# Patient Record
Sex: MALE | Race: BLACK OR AFRICAN AMERICAN | NOT HISPANIC OR LATINO | URBAN - METROPOLITAN AREA
[De-identification: names, ages, dates, MRNs, and addresses within clinical notes are randomized per-mention and may not be internally consistent; named-entity substitution may affect disease eponyms.]

---

## 2023-06-07 ENCOUNTER — HOSPITAL ENCOUNTER (EMERGENCY)
Facility: HOSPITAL | Age: 53
Discharge: HOME | End: 2023-06-08
Attending: EMERGENCY MEDICINE
Payer: COMMERCIAL

## 2023-06-07 DIAGNOSIS — F32.A DEPRESSION, UNSPECIFIED DEPRESSION TYPE: Primary | ICD-10-CM

## 2023-06-07 PROCEDURE — 99283 EMERGENCY DEPT VISIT LOW MDM: CPT | Mod: ER

## 2023-06-07 NOTE — Clinical Note
"Vinayak"Muna Turcios was seen and treated in our emergency department on 6/7/2023.  He may return to work on 06/09/2023.       If you have any questions or concerns, please don't hesitate to call.      Kiran Antoine MD"

## 2023-06-08 VITALS
HEART RATE: 72 BPM | DIASTOLIC BLOOD PRESSURE: 83 MMHG | TEMPERATURE: 98 F | OXYGEN SATURATION: 98 % | SYSTOLIC BLOOD PRESSURE: 144 MMHG | RESPIRATION RATE: 16 BRPM

## 2023-06-08 LAB
ALBUMIN SERPL BCP-MCNC: 4.5 G/DL (ref 3.5–5.2)
ALP SERPL-CCNC: 65 U/L (ref 55–135)
ALT SERPL W/O P-5'-P-CCNC: 19 U/L (ref 10–44)
AMPHET+METHAMPHET UR QL: NEGATIVE
ANION GAP SERPL CALC-SCNC: 19 MMOL/L (ref 8–16)
APAP SERPL-MCNC: <3 UG/ML (ref 10–20)
AST SERPL-CCNC: 25 U/L (ref 10–40)
BARBITURATES UR QL SCN>200 NG/ML: NEGATIVE
BASOPHILS # BLD AUTO: 0.03 K/UL (ref 0–0.2)
BASOPHILS NFR BLD: 0.5 % (ref 0–1.9)
BENZODIAZ UR QL SCN>200 NG/ML: NEGATIVE
BILIRUB SERPL-MCNC: 0.8 MG/DL (ref 0.1–1)
BILIRUB UR QL STRIP: NEGATIVE
BUN SERPL-MCNC: 8 MG/DL (ref 6–20)
BZE UR QL SCN: NEGATIVE
CALCIUM SERPL-MCNC: 9.8 MG/DL (ref 8.7–10.5)
CANNABINOIDS UR QL SCN: NEGATIVE
CHLORIDE SERPL-SCNC: 106 MMOL/L (ref 95–110)
CLARITY UR REFRACT.AUTO: CLEAR
CO2 SERPL-SCNC: 15 MMOL/L (ref 23–29)
COLOR UR AUTO: YELLOW
CREAT SERPL-MCNC: 1.2 MG/DL (ref 0.5–1.4)
CREAT UR-MCNC: 34.9 MG/DL (ref 23–375)
DIFFERENTIAL METHOD: NORMAL
EOSINOPHIL # BLD AUTO: 0.1 K/UL (ref 0–0.5)
EOSINOPHIL NFR BLD: 1 % (ref 0–8)
ERYTHROCYTE [DISTWIDTH] IN BLOOD BY AUTOMATED COUNT: 12.4 % (ref 11.5–14.5)
EST. GFR  (NO RACE VARIABLE): >60 ML/MIN/1.73 M^2
ETHANOL SERPL-MCNC: 113 MG/DL
GLUCOSE SERPL-MCNC: 124 MG/DL (ref 70–110)
GLUCOSE UR QL STRIP: NEGATIVE
HCT VFR BLD AUTO: 42 % (ref 40–54)
HGB BLD-MCNC: 14.8 G/DL (ref 14–18)
HGB UR QL STRIP: NEGATIVE
IMM GRANULOCYTES # BLD AUTO: 0.02 K/UL (ref 0–0.04)
IMM GRANULOCYTES NFR BLD AUTO: 0.3 % (ref 0–0.5)
KETONES UR QL STRIP: NEGATIVE
LEUKOCYTE ESTERASE UR QL STRIP: NEGATIVE
LYMPHOCYTES # BLD AUTO: 1.8 K/UL (ref 1–4.8)
LYMPHOCYTES NFR BLD: 29.7 % (ref 18–48)
MCH RBC QN AUTO: 30.5 PG (ref 27–31)
MCHC RBC AUTO-ENTMCNC: 35.2 G/DL (ref 32–36)
MCV RBC AUTO: 87 FL (ref 82–98)
METHADONE UR QL SCN>300 NG/ML: NEGATIVE
MONOCYTES # BLD AUTO: 0.5 K/UL (ref 0.3–1)
MONOCYTES NFR BLD: 8.4 % (ref 4–15)
NEUTROPHILS # BLD AUTO: 3.7 K/UL (ref 1.8–7.7)
NEUTROPHILS NFR BLD: 60.1 % (ref 38–73)
NITRITE UR QL STRIP: NEGATIVE
NRBC BLD-RTO: 0 /100 WBC
OPIATES UR QL SCN: NEGATIVE
PCP UR QL SCN>25 NG/ML: NEGATIVE
PH UR STRIP: 6 [PH] (ref 5–8)
PLATELET # BLD AUTO: 290 K/UL (ref 150–450)
PMV BLD AUTO: 9.3 FL (ref 9.2–12.9)
POTASSIUM SERPL-SCNC: 3.5 MMOL/L (ref 3.5–5.1)
PROT SERPL-MCNC: 8.3 G/DL (ref 6–8.4)
PROT UR QL STRIP: NEGATIVE
RBC # BLD AUTO: 4.85 M/UL (ref 4.6–6.2)
SODIUM SERPL-SCNC: 140 MMOL/L (ref 136–145)
SP GR UR STRIP: <=1.005 (ref 1–1.03)
TOXICOLOGY INFORMATION: NORMAL
TSH SERPL DL<=0.005 MIU/L-ACNC: 1.27 UIU/ML (ref 0.4–4)
URN SPEC COLLECT METH UR: ABNORMAL
UROBILINOGEN UR STRIP-ACNC: NEGATIVE EU/DL
WBC # BLD AUTO: 6.1 K/UL (ref 3.9–12.7)

## 2023-06-08 PROCEDURE — 80143 DRUG ASSAY ACETAMINOPHEN: CPT | Mod: ER | Performed by: EMERGENCY MEDICINE

## 2023-06-08 PROCEDURE — G0426 INPT/ED TELECONSULT50: HCPCS | Mod: GT,,, | Performed by: PSYCHIATRY & NEUROLOGY

## 2023-06-08 PROCEDURE — 80053 COMPREHEN METABOLIC PANEL: CPT | Mod: ER | Performed by: EMERGENCY MEDICINE

## 2023-06-08 PROCEDURE — G0426 PR INPT TELEHEALTH CONSULT 50M: ICD-10-PCS | Mod: GT,,, | Performed by: PSYCHIATRY & NEUROLOGY

## 2023-06-08 PROCEDURE — 84443 ASSAY THYROID STIM HORMONE: CPT | Mod: ER | Performed by: EMERGENCY MEDICINE

## 2023-06-08 PROCEDURE — 80307 DRUG TEST PRSMV CHEM ANLYZR: CPT | Mod: ER | Performed by: EMERGENCY MEDICINE

## 2023-06-08 PROCEDURE — 85025 COMPLETE CBC W/AUTO DIFF WBC: CPT | Mod: ER | Performed by: EMERGENCY MEDICINE

## 2023-06-08 PROCEDURE — 82077 ASSAY SPEC XCP UR&BREATH IA: CPT | Mod: ER | Performed by: EMERGENCY MEDICINE

## 2023-06-08 PROCEDURE — 81003 URINALYSIS AUTO W/O SCOPE: CPT | Mod: ER,59 | Performed by: EMERGENCY MEDICINE
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
## 2025-02-26 DIAGNOSIS — M79.645 CHRONIC PAIN OF LEFT THUMB: Primary | ICD-10-CM

## 2025-02-26 DIAGNOSIS — G89.29 CHRONIC PAIN OF LEFT THUMB: Primary | ICD-10-CM

## 2025-02-27 ENCOUNTER — TELEPHONE (OUTPATIENT)
Dept: RESPIRATORY THERAPY | Facility: HOSPITAL | Age: 55
End: 2025-02-27
Payer: COMMERCIAL

## 2025-02-27 ENCOUNTER — TELEPHONE (OUTPATIENT)
Dept: PREADMISSION TESTING | Facility: HOSPITAL | Age: 55
End: 2025-02-27
Payer: COMMERCIAL

## 2025-02-27 ENCOUNTER — ANESTHESIA EVENT (OUTPATIENT)
Dept: SURGERY | Facility: HOSPITAL | Age: 55
End: 2025-02-27
Payer: COMMERCIAL

## 2025-02-27 ENCOUNTER — TELEPHONE (OUTPATIENT)
Dept: ORTHOPEDICS | Facility: CLINIC | Age: 55
End: 2025-02-27

## 2025-02-27 ENCOUNTER — HOSPITAL ENCOUNTER (OUTPATIENT)
Dept: RADIOLOGY | Facility: HOSPITAL | Age: 55
Discharge: HOME | End: 2025-02-27
Attending: ORTHOPAEDIC SURGERY
Payer: COMMERCIAL

## 2025-02-27 ENCOUNTER — OFFICE VISIT (OUTPATIENT)
Dept: ORTHOPEDICS | Facility: CLINIC | Age: 55
End: 2025-02-27
Payer: COMMERCIAL

## 2025-02-27 DIAGNOSIS — M79.645 CHRONIC PAIN OF LEFT THUMB: ICD-10-CM

## 2025-02-27 DIAGNOSIS — G89.29 CHRONIC PAIN OF LEFT THUMB: ICD-10-CM

## 2025-02-27 DIAGNOSIS — S62.522B OPEN DISPLACED FRACTURE OF DISTAL PHALANX OF LEFT THUMB, INITIAL ENCOUNTER: Primary | ICD-10-CM
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
## 2025-02-27 DIAGNOSIS — Z01.818 PRE-OP TESTING: Primary | ICD-10-CM

## 2025-02-27 DIAGNOSIS — S61.112A LACERATION OF LEFT THUMB WITHOUT FOREIGN BODY WITH DAMAGE TO NAIL, INITIAL ENCOUNTER: ICD-10-CM

## 2025-02-27 PROCEDURE — 99213 OFFICE O/P EST LOW 20 MIN: CPT | Mod: PBBFAC,25 | Performed by: ORTHOPAEDIC SURGERY

## 2025-02-27 PROCEDURE — 73140 X-RAY EXAM OF FINGER(S): CPT | Mod: TC,LT

## 2025-02-27 PROCEDURE — 99999 PR PBB SHADOW E&M-EST. PATIENT-LVL III: CPT | Mod: PBBFAC,,, | Performed by: ORTHOPAEDIC SURGERY

## 2025-02-27 PROCEDURE — 73140 X-RAY EXAM OF FINGER(S): CPT | Mod: 26,LT,, | Performed by: STUDENT IN AN ORGANIZED HEALTH CARE EDUCATION/TRAINING PROGRAM

## 2025-02-27 RX ORDER — ATORVASTATIN CALCIUM 10 MG/1
10 TABLET, FILM COATED ORAL DAILY
COMMUNITY

## 2025-02-27 RX ORDER — HYDROCODONE BITARTRATE AND ACETAMINOPHEN 5; 325 MG/1; MG/1
1 TABLET ORAL EVERY 6 HOURS PRN
Qty: 10 TABLET | Refills: 0 | Status: SHIPPED | OUTPATIENT
Start: 2025-02-27 | End: 2025-03-06

## 2025-02-27 RX ORDER — TRAZODONE HYDROCHLORIDE 50 MG/1
50 TABLET ORAL NIGHTLY
COMMUNITY

## 2025-02-27 RX ORDER — SERTRALINE HYDROCHLORIDE 25 MG/1
25 TABLET, FILM COATED ORAL DAILY
COMMUNITY

## 2025-02-27 RX ORDER — PANTOPRAZOLE SODIUM 20 MG/1
20 TABLET, DELAYED RELEASE ORAL DAILY
COMMUNITY

## 2025-02-27 RX ORDER — LOSARTAN POTASSIUM 25 MG/1
25 TABLET ORAL DAILY
COMMUNITY

## 2025-02-27 NOTE — TELEPHONE ENCOUNTER
Spoke to patient and let him know that Dr. Owens has sent his prescription for pain to his preferred pharmacy. He understood and was grateful for the call.     ----- Message from Denise sent at 2/27/2025 11:42 AM CST -----  Contact: America  ..Type:  Patient Request Call BackWho Called: America (wife)Does the patient know what this is regarding?: America is calling to verify if prescription for pain meds have been sent Would the patient rather a call back or a response via [facility]? Call [facility] Call Back Number: [phone] or [phone] Information: .[facility] - [place] - [address] [facility] [place] [address]Lcyav: [phone] Fax: [phone]

## 2025-02-27 NOTE — ASSESSMENT & PLAN NOTE
The patient and I talked at length about the natural history and pathophysiology of his injury which is left thumb open distal phalanx fracture , he understands that this may lead to chronic problems which may have acute episodic exacerbations.   Symptoms may resolve, worsen and even become permanent.  The patient understands the treatment options including observation, activity modification, therapy, NSAIDs, splints and the surgical options including debridement.  We discussed the risks of the diagnosis and the treatment options including pain, infection, bleeding, damage to nerves and vessels, stiffness, scarring, incomplete relief or recurrence of symptoms, poor pain and functional outcomes.  Unique risks of this diagnosis and the treatment include nail deformity, nonunion, infection.  The patients treatment is further complicated by an increased risk of stiffness, wound healing complications and infection due to diabetes.  The patient has elected to proceed with operative debridement, possible pin fixation, nail bed repair and we will follow up postop.  He will  his antibiotics that have already been called in for him.

## 2025-02-27 NOTE — TELEPHONE ENCOUNTER
Called and spoke with America, the patient's wife. I let her know that I was able to get in touch with Oswaldo. I let her know that we have the claim number from [facility] and that we will work on the authorization. I explained that this procedure is medically urgent and to show up for the surgery as instructed. I let her know we will make sure that everything is billed to [facility]. She understood and was grateful for the call.     ----- Message from Denise sent at 2/27/2025  4:23 PM CST -----  Contact: America  ..Type:  Patient Request Call BackWho Called:America (wife)Does the patient know what this is regarding?: Worker's Comp Claim numberWould the patient rather a call back or a response via [facility]? Call [facility] Call Back Number: .[phone] Additional Information: Patient's wife is trying to contact Ashli about cost of surgery, Stating she now has Worker's Comp claim number

## 2025-02-27 NOTE — TELEPHONE ENCOUNTER
..Called and spoke with the patient about the following:      Your Surgery arrival time is at 09:00 am on 2/28/2025 at [facility].   The address is [address] [place] [address].       *If you are running late or have questions the morning of surgery, please call the Pre-OP Department @ [phone].     Do not eat or drink after 12 midnight, including water. Do not smoke or use chewing tobacco after 12 midnight!  OK to brush teeth, but no gum, candy, or mints!      Additional Instructions:  You may be required to provide a urine sample prior to procedure;   Please ask  for a specimen cup if you need to use the restroom prior to being called into pre-op.     Please come to the main lobby and be prepared to show your photo ID and insurance card.       Only take specific medications discussed with the Pre-Admit Nurse.      Please call with any questions or concerns ([phone] or [phone])    [facility]/Ride Policy:   Adults:  Only 1 adult allowed (must be 18 or older) to accompany you and MUST STAY through the entire length of admission.     -Must have a ride home from a responsible adult that you know and trust.    -Medical Transport, Uber or Lyft can only be used if patient has a responsible adult to accompany them during ride home.    Pediatrics:  Pediatric patients are encouraged to have 2 adults (must be 18 or older) accompany them to the surgery center.   ~If the parent/legal guardian is unable to stay for the duration of the surgery time,   you MUST have someone over the age of 18 able to stay with the patient until time of discharge.     ~The parent/legal guardian must be available to be reached by phone at all times if they are unable to stay with the patient.

## 2025-02-27 NOTE — TELEPHONE ENCOUNTER
Pre op instructions reviewed with pt per phone.      To confirm, your doctor has instructed you: Surgery is scheduled for 2/28/25.   Pre admit office will call the afternoon prior to surgery between 1PM and 3PM with arrival time.    Surgery will be at [facility],  The address is [address]. [place] [address].      IMPORTANT INSTRUCTIONS!    Do not eat or drink after 12 midnight, including water. Do not smoke or use chewing tobacco after 12 midnight!  OK to brush teeth, but no gum, candy, or mints!      *Take only these medicines with a small swallow of water-morning of surgery*     NONE         ____ Stop taking all vitamins, herbal supplements, Aspirin, & NSAIDS (Ibuprofen, Advil, Aleve) 7 days prior to surgery, as these can thin your blood.    ____ Weight loss medication, such as Adipex and Phentermine, must be stopped 14 days prior to surgery, no exceptions!    *Diabetic Patients: If you take diabetic or weight loss medication, do NOT take morning of surgery unless instructed by Doctor. Metformin to be stopped 24 hrs prior to surgery. DO NOT take short-acting insulin the day of surgery. Only take HALF of your regular dose of long-acting insulin the night before surgery, unless instructed otherwise. Blood sugars will be checked in pre-op.   ~Ozempic/Mounjaro/Wegovy/Trulicity/Semaglutide injections must be stopped 7 days prior to surgery.     Please notify MD office if you develop an active infection, are prescribed antibiotics by someone other than the surgeon doing your surgery, or visit urgent care/ER.    Bathing Instructions:   The night before surgery and the morning prior to coming to the hospital:    - Shower & rinse your body as usual with anti-bacterial Soap (Dial or Lever 2000)   -Hibiclens (if indicated) use AFTER anti-bacterial soap; 1 packet PM/1 packet in AM on surgical site only   -Do not use hibiclens on your head, face, or genitals.    -Do not wash with anti-bacterial soap  after you use the hibiclens.    -Do not shave surgical site 5-7 days prior to surgery.    -Pubic hair 7 days prior to surgery (OBGYN/Urology only).       Additional Instructions:   __ No makeup, powder, lotions, creams, or body spray to skin   __ No deodorant if you are having: breast procedure, PORT insertion, or shoulder surgery!   __ No nail polish or artificial nails due to risk of infection.             **SURGERY MAY BE CANCELLED AT SURGEON'S DISCRETION IF ARTIFICIAL/NAIL POLISH IS PRESENT!!!**  __ Please remove all piercings and leave all jewelry at home.    **SURGERY WILL BE CANCELLED IF PIERCINGS ARE PRESENT!!!**    __ Dentures, Hearing Aids and Contact Lens need to be removed prior to the start of surgery.    __ Avoid Alcoholic beverages 3 days prior to surgery, as it can thin the blood, unless told otherwise by pre-admit department.  __ Females: may need to give a urine sample the morning of surgery;   **Please ask  for a specimen cup if you need to use the restroom prior to being called into pre-op.**  __ Males: Stop ED medications (Viagra, Cialis) 24 hrs prior to surgery.  __ You must have transportation, and they MUST stay the entire time.   __  Bring photo ID and insurance information to hospital    What to Wear:  Clean, loose-fitting clothing. Please allow for dressings/bandages/surgical equipment/drains.   ~Breast Patients: We recommend a button up shirt so you do not have to lift your arms.   Amazon Link recommended by breast surgeons if you will have drains in place: https://a.co/d/gWTV9aP  ~Shoulder Patients: We recommend a button up shirt. If unavailable, an oversized t-shirt (2 sizes bigger than your normal size) or a stretchy dress will also work.   Amazon Link for hospital type button sleeve t-shirt: https://a.co/d/86BAbRk  ~Knee Patients: We recommend oversized pants/shorts or a dress to accommodate any knee braces in place. Braces will not be removed to get dressed.    [facility]  Visitor/Ride Policy:    Only 1 adult allowed (18 or older) to accompany you and MUST STAY through the entire admission length.      -Must have a ride home from a responsible adult that you know and trust.     -Medical Transport, Uber or Lyft can only be used if patient has a responsible adult to   accompany them during ride home.      ~Your ride MUST STAY the entire time until you are discharged~   Please notify the pre-admit department prior to surgery if you use medication transportation so we can verify your arrival/pickup time!   -The patient is responsible for setting up their own transportation!    Discharge Prescriptions:  Your discharge prescriptions will be sent next door to [facility], unless otherwise discussed with your surgeon. Your  will be responsible for calling the pharmacy ([phone]) to begin the prescription filling process. They will receive a text message with instructions once you are in recovery. Please make sure we have your insurance information and you bring a payment method (cash or card) if needed for prescriptions. If you have  insurance, please let the pre-op nurse know, as the pharmacy does not take this insurance.     *If you are running late or have questions the morning of surgery, please call the Pre-OP Department @ [phone].       *Please Call [facility] Pre-Admit Department for surgery instruction questions:   [phone]     Payment Questions:                Billing Question Numbers:         [phone]

## 2025-02-27 NOTE — PROGRESS NOTES
TAWANNA Owens M.D.  Orthopaedic Hand and Wrist Surgery  [facility]    Patient ID: Vinayak Turcios  YOB: 1970  MRN: [id number]    Provider Note/Medical Decision Makin. Open displaced fracture of distal phalanx of left thumb, initial encounter  Assessment & Plan:  The patient and I talked at length about the natural history and pathophysiology of his injury which is left thumb open distal phalanx fracture , he understands that this may lead to chronic problems which may have acute episodic exacerbations.   Symptoms may resolve, worsen and even become permanent.  The patient understands the treatment options including observation, activity modification, therapy, NSAIDs, splints and the surgical options including debridement.  We discussed the risks of the diagnosis and the treatment options including pain, infection, bleeding, damage to nerves and vessels, stiffness, scarring, incomplete relief or recurrence of symptoms, poor pain and functional outcomes.  Unique risks of this diagnosis and the treatment include nail deformity, nonunion, infection.  The patients treatment is further complicated by an increased risk of stiffness, wound healing complications and infection due to diabetes.  The patient has elected to proceed with operative debridement, possible pin fixation, nail bed repair and we will follow up postop.  He will  his antibiotics that have already been called in for him.      Orders:  -     Case Request Operating Room: DEBRIDEMENT, OPEN FRACTURE, HAND, PHALANGES, REPAIR, NAIL BED, OPEN REDUCTION, FRACTURE, PHALANX, FINGER  -     HYDROcodone-acetaminophen (NORCO) 5-325 mg per tablet; Take 1 tablet by mouth every 6 (six) hours as needed (Breakthrough pain).  Dispense: 10 tablet; Refill: 0    2. Laceration of left thumb without foreign body with damage to nail, initial encounter  -     Case Request Operating Room: DEBRIDEMENT, OPEN FRACTURE, HAND, PHALANGES,  REPAIR, NAIL BED, OPEN REDUCTION, FRACTURE, PHALANX, FINGER    3. Work related injury  -     Case Request Operating Room: DEBRIDEMENT, OPEN FRACTURE, HAND, PHALANGES, REPAIR, NAIL BED, OPEN REDUCTION, FRACTURE, PHALANX, FINGER    4. Chronic pain of left thumb         Chief Complaint: Pain, Injury, and Swelling of the Left Hand (L thumb injury 2/26 )      Referred By: Reno Owens    History of Present Illness: Vinayak Turcios is a 54 y.o. male here today for evaluation in his left thumb he hit his left thumb with a wrench yesterday [facility] as this was an on-the-job injury he was given tetanus shot and some antibiotics he has yet to  his antibiotics he is here today for evaluation.      Patient was queried and this is the extent of the patients current complaints today.    Past Medical History:     There is no height or weight on file to calculate BMI.  No past medical history on file.  No past surgical history on file.  No family history on file.  Social History[1]  Medication List with Changes/Refills   New Medications    HYDROCODONE-ACETAMINOPHEN (NORCO) 5-325 MG PER TABLET    Take 1 tablet by mouth every 6 (six) hours as needed (Breakthrough pain).   Current Medications    ATORVASTATIN (LIPITOR) 10 MG TABLET    Take 10 mg by mouth once daily.    LOSARTAN (COZAAR) 25 MG TABLET    Take 25 mg by mouth once daily.    PANTOPRAZOLE (PROTONIX) 20 MG TABLET    Take 20 mg by mouth once daily.    SERTRALINE (ZOLOFT) 25 MG TABLET    Take 25 mg by mouth once daily.    SITAGLIPTIN PHOSPHATE (JANUVIA) 25 MG TAB    Take 25 mg by mouth once daily.    TRAZODONE (DESYREL) 50 MG TABLET    Take 50 mg by mouth every evening.     Review of patient's allergies indicates:  No Known Allergies  ROS    Physical Exam:   GENERAL: Well appearing, appropriate for stated age, no acute distress.  CARDIOVASCULAR:  Fingers have good brisk refill and good turgor.   PULMONARY: Respirations are even and  non-labored.  NEURO: Awake, alert, and oriented x 3.  PSYCH: Mood & affect are appropriate.  Ortho/SPM Exam  Hand/Wrist Musculoskeletal Exam  He has a unstable nail plate  There is a subungual hematoma of 75%  Intact EPL and FPL  No tenderness in his thumb MP joint  He has a laceration over the dorsal thumb at the level of the IP joint  decreased sensation to the tip of the thumb  Good brisk cap refill tips of all fingers    Imaging:    Relevant imaging results reviewed and interpreted by me, discussed with the patient and / or family today.   X-ray of the left thumb show a left thumb distal phalanx fracture      Provider Note/Medical Decision Makin. Open displaced fracture of distal phalanx of left thumb, initial encounter  Assessment & Plan:  The patient and I talked at length about the natural history and pathophysiology of his injury which is left thumb open distal phalanx fracture , he understands that this may lead to chronic problems which may have acute episodic exacerbations.   Symptoms may resolve, worsen and even become permanent.  The patient understands the treatment options including observation, activity modification, therapy, NSAIDs, splints and the surgical options including debridement.  We discussed the risks of the diagnosis and the treatment options including pain, infection, bleeding, damage to nerves and vessels, stiffness, scarring, incomplete relief or recurrence of symptoms, poor pain and functional outcomes.  Unique risks of this diagnosis and the treatment include nail deformity, nonunion, infection.  The patients treatment is further complicated by an increased risk of stiffness, wound healing complications and infection due to diabetes.  The patient has elected to proceed with operative debridement, possible pin fixation, nail bed repair and we will follow up postop.  He will  his antibiotics that have already been called in for him.      Orders:  -     Case Request  Operating Room: DEBRIDEMENT, OPEN FRACTURE, HAND, PHALANGES, REPAIR, NAIL BED, OPEN REDUCTION, FRACTURE, PHALANX, FINGER  -     HYDROcodone-acetaminophen (NORCO) 5-325 mg per tablet; Take 1 tablet by mouth every 6 (six) hours as needed (Breakthrough pain).  Dispense: 10 tablet; Refill: 0    2. Laceration of left thumb without foreign body with damage to nail, initial encounter  -     Case Request Operating Room: DEBRIDEMENT, OPEN FRACTURE, HAND, PHALANGES, REPAIR, NAIL BED, OPEN REDUCTION, FRACTURE, PHALANX, FINGER    3. Work related injury  -     Case Request Operating Room: DEBRIDEMENT, OPEN FRACTURE, HAND, PHALANGES, REPAIR, NAIL BED, OPEN REDUCTION, FRACTURE, PHALANX, FINGER    4. Chronic pain of left thumb         I discussed worrisome and red flag signs and symptoms with the patient. The patient expressed understanding and agreed to alert me immediately or to go to the emergency room if they experience any of these.   Treatment plan was developed with input from the patient/family, and they expressed understanding and agreement with the plan. All questions were answered today.    There are no Patient Instructions on file for this visit.    TAWANNA Owens M.D.  [facility] Department of Orthopedic Surgery  Orthopedic Hand and Wrist Surgeon    Arpita Nelson Hand Specialist  Dr. Clark Owens   Google Review   Health[employer]   [employer]     Disclaimer: This note was prepared using a voice recognition system and is likely to have sound alike errors within the text.          [1]   Social History  Socioeconomic History    Marital status:

## 2025-02-27 NOTE — H&P (VIEW-ONLY)
TAWANNA Owens M.D.  Orthopaedic Hand and Wrist Surgery  [facility]    Patient ID: Vinayak Turcios  YOB: 1970  MRN: [id number]    Provider Note/Medical Decision Makin. Open displaced fracture of distal phalanx of left thumb, initial encounter  Assessment & Plan:  The patient and I talked at length about the natural history and pathophysiology of his injury which is left thumb open distal phalanx fracture , he understands that this may lead to chronic problems which may have acute episodic exacerbations.   Symptoms may resolve, worsen and even become permanent.  The patient understands the treatment options including observation, activity modification, therapy, NSAIDs, splints and the surgical options including debridement.  We discussed the risks of the diagnosis and the treatment options including pain, infection, bleeding, damage to nerves and vessels, stiffness, scarring, incomplete relief or recurrence of symptoms, poor pain and functional outcomes.  Unique risks of this diagnosis and the treatment include nail deformity, nonunion, infection.  The patients treatment is further complicated by an increased risk of stiffness, wound healing complications and infection due to diabetes.  The patient has elected to proceed with operative debridement, possible pin fixation, nail bed repair and we will follow up postop.  He will  his antibiotics that have already been called in for him.      Orders:  -     Case Request Operating Room: DEBRIDEMENT, OPEN FRACTURE, HAND, PHALANGES, REPAIR, NAIL BED, OPEN REDUCTION, FRACTURE, PHALANX, FINGER  -     HYDROcodone-acetaminophen (NORCO) 5-325 mg per tablet; Take 1 tablet by mouth every 6 (six) hours as needed (Breakthrough pain).  Dispense: 10 tablet; Refill: 0    2. Laceration of left thumb without foreign body with damage to nail, initial encounter  -     Case Request Operating Room: DEBRIDEMENT, OPEN FRACTURE, HAND, PHALANGES,  REPAIR, NAIL BED, OPEN REDUCTION, FRACTURE, PHALANX, FINGER    3. Work related injury  -     Case Request Operating Room: DEBRIDEMENT, OPEN FRACTURE, HAND, PHALANGES, REPAIR, NAIL BED, OPEN REDUCTION, FRACTURE, PHALANX, FINGER    4. Chronic pain of left thumb         Chief Complaint: Pain, Injury, and Swelling of the Left Hand (L thumb injury 2/26 )      Referred By: Reno Owens    History of Present Illness: Vinayak Turcios is a 54 y.o. male here today for evaluation in his left thumb he hit his left thumb with a wrench yesterday he has seen at [facility] as this was an on-the-job injury he was given tetanus shot and some antibiotics he has yet to  his antibiotics he is here today for evaluation.      Patient was queried and this is the extent of the patients current complaints today.    Past Medical History:     There is no height or weight on file to calculate BMI.  No past medical history on file.  No past surgical history on file.  No family history on file.  Social History[1]  Medication List with Changes/Refills   New Medications    HYDROCODONE-ACETAMINOPHEN (NORCO) 5-325 MG PER TABLET    Take 1 tablet by mouth every 6 (six) hours as needed (Breakthrough pain).   Current Medications    ATORVASTATIN (LIPITOR) 10 MG TABLET    Take 10 mg by mouth once daily.    LOSARTAN (COZAAR) 25 MG TABLET    Take 25 mg by mouth once daily.    PANTOPRAZOLE (PROTONIX) 20 MG TABLET    Take 20 mg by mouth once daily.    SERTRALINE (ZOLOFT) 25 MG TABLET    Take 25 mg by mouth once daily.    SITAGLIPTIN PHOSPHATE (JANUVIA) 25 MG TAB    Take 25 mg by mouth once daily.    TRAZODONE (DESYREL) 50 MG TABLET    Take 50 mg by mouth every evening.     Review of patient's allergies indicates:  No Known Allergies  ROS    Physical Exam:   GENERAL: Well appearing, appropriate for stated age, no acute distress.  CARDIOVASCULAR:  Fingers have good brisk refill and good turgor.   PULMONARY: Respirations are even and  non-labored.  NEURO: Awake, alert, and oriented x 3.  PSYCH: Mood & affect are appropriate.  Ortho/SPM Exam  Hand/Wrist Musculoskeletal Exam  He has a unstable nail plate  There is a subungual hematoma of 75%  Intact EPL and FPL  No tenderness in his thumb MP joint  He has a laceration over the dorsal thumb at the level of the IP joint  decreased sensation to the tip of the thumb  Good brisk cap refill tips of all fingers    Imaging:    Relevant imaging results reviewed and interpreted by me, discussed with the patient and / or family today.   X-ray of the left thumb show a left thumb distal phalanx fracture      Provider Note/Medical Decision Makin. Open displaced fracture of distal phalanx of left thumb, initial encounter  Assessment & Plan:  The patient and I talked at length about the natural history and pathophysiology of his injury which is left thumb open distal phalanx fracture , he understands that this may lead to chronic problems which may have acute episodic exacerbations.   Symptoms may resolve, worsen and even become permanent.  The patient understands the treatment options including observation, activity modification, therapy, NSAIDs, splints and the surgical options including debridement.  We discussed the risks of the diagnosis and the treatment options including pain, infection, bleeding, damage to nerves and vessels, stiffness, scarring, incomplete relief or recurrence of symptoms, poor pain and functional outcomes.  Unique risks of this diagnosis and the treatment include nail deformity, nonunion, infection.  The patients treatment is further complicated by an increased risk of stiffness, wound healing complications and infection due to diabetes.  The patient has elected to proceed with operative debridement, possible pin fixation, nail bed repair and we will follow up postop.  He will  his antibiotics that have already been called in for him.      Orders:  -     Case Request  Operating Room: DEBRIDEMENT, OPEN FRACTURE, HAND, PHALANGES, REPAIR, NAIL BED, OPEN REDUCTION, FRACTURE, PHALANX, FINGER  -     HYDROcodone-acetaminophen (NORCO) 5-325 mg per tablet; Take 1 tablet by mouth every 6 (six) hours as needed (Breakthrough pain).  Dispense: 10 tablet; Refill: 0    2. Laceration of left thumb without foreign body with damage to nail, initial encounter  -     Case Request Operating Room: DEBRIDEMENT, OPEN FRACTURE, HAND, PHALANGES, REPAIR, NAIL BED, OPEN REDUCTION, FRACTURE, PHALANX, FINGER    3. Work related injury  -     Case Request Operating Room: DEBRIDEMENT, OPEN FRACTURE, HAND, PHALANGES, REPAIR, NAIL BED, OPEN REDUCTION, FRACTURE, PHALANX, FINGER    4. Chronic pain of left thumb         I discussed worrisome and red flag signs and symptoms with the patient. The patient expressed understanding and agreed to alert me immediately or to go to the emergency room if they experience any of these.   Treatment plan was developed with input from the patient/family, and they expressed understanding and agreement with the plan. All questions were answered today.    There are no Patient Instructions on file for this visit.    TAWANNA Owens M.D.  [facility] Department of Orthopedic Surgery  Orthopedic Hand and Wrist Surgeon    Arpita Nelson Hand Specialist  Dr. Clark Owens   Google Review   [employer]   [employer]     Disclaimer: This note was prepared using a voice recognition system and is likely to have sound alike errors within the text.          [1]   Social History  Socioeconomic History    Marital status:

## 2025-02-28 ENCOUNTER — ANESTHESIA (OUTPATIENT)
Dept: SURGERY | Facility: HOSPITAL | Age: 55
End: 2025-02-28
Payer: COMMERCIAL

## 2025-02-28 ENCOUNTER — HOSPITAL ENCOUNTER (OUTPATIENT)
Dept: RADIOLOGY | Facility: HOSPITAL | Age: 55
Discharge: HOME | End: 2025-02-28
Attending: ORTHOPAEDIC SURGERY | Admitting: ORTHOPAEDIC SURGERY
Payer: COMMERCIAL

## 2025-02-28 ENCOUNTER — HOSPITAL ENCOUNTER (OUTPATIENT)
Facility: HOSPITAL | Age: 55
Discharge: HOME | End: 2025-02-28
Attending: ORTHOPAEDIC SURGERY | Admitting: ORTHOPAEDIC SURGERY
Payer: OTHER MISCELLANEOUS

## 2025-02-28 DIAGNOSIS — S62.522B OPEN DISPLACED FRACTURE OF DISTAL PHALANX OF LEFT THUMB, INITIAL ENCOUNTER: Primary | ICD-10-CM

## 2025-02-28 DIAGNOSIS — Z41.9 SURGERY, ELECTIVE: ICD-10-CM

## 2025-02-28 LAB — POCT GLUCOSE: 106 MG/DL (ref 70–110)

## 2025-02-28 PROCEDURE — 36000708 HC OR TIME LEV III 1ST 15 MIN: Performed by: ORTHOPAEDIC SURGERY

## 2025-02-28 PROCEDURE — 63600175 PHARM REV CODE 636 W HCPCS: Performed by: NURSE ANESTHETIST, CERTIFIED REGISTERED

## 2025-02-28 PROCEDURE — 63600175 PHARM REV CODE 636 W HCPCS: Performed by: ORTHOPAEDIC SURGERY

## 2025-02-28 PROCEDURE — 36000709 HC OR TIME LEV III EA ADD 15 MIN: Performed by: ORTHOPAEDIC SURGERY

## 2025-02-28 PROCEDURE — 26765 TREAT FINGER FRACTURE EACH: CPT | Mod: FA,,, | Performed by: ORTHOPAEDIC SURGERY

## 2025-02-28 PROCEDURE — 71000015 HC POSTOP RECOV 1ST HR: Performed by: ORTHOPAEDIC SURGERY

## 2025-02-28 PROCEDURE — 11012 DEB SKIN BONE AT FX SITE: CPT | Mod: 51,,, | Performed by: ORTHOPAEDIC SURGERY

## 2025-02-28 PROCEDURE — 73120 X-RAY EXAM OF HAND: CPT | Mod: TC,LT

## 2025-02-28 PROCEDURE — 71000033 HC RECOVERY, INTIAL HOUR: Performed by: ORTHOPAEDIC SURGERY

## 2025-02-28 PROCEDURE — 82962 GLUCOSE BLOOD TEST: CPT | Performed by: ORTHOPAEDIC SURGERY

## 2025-02-28 PROCEDURE — 37000009 HC ANESTHESIA EA ADD 15 MINS: Performed by: ORTHOPAEDIC SURGERY

## 2025-02-28 PROCEDURE — 37000008 HC ANESTHESIA 1ST 15 MINUTES: Performed by: ORTHOPAEDIC SURGERY

## 2025-02-28 RX ORDER — CHLORHEXIDINE GLUCONATE ORAL RINSE 1.2 MG/ML
10 SOLUTION DENTAL 2 TIMES DAILY
Status: DISCONTINUED | OUTPATIENT
Start: 2025-02-28 | End: 2025-02-28 | Stop reason: HOSPADM

## 2025-02-28 RX ORDER — CEFAZOLIN SODIUM 1 G/3ML
INJECTION, POWDER, FOR SOLUTION INTRAMUSCULAR; INTRAVENOUS
Status: DISCONTINUED | OUTPATIENT
Start: 2025-02-28 | End: 2025-02-28

## 2025-02-28 RX ORDER — BUPIVACAINE HYDROCHLORIDE 2.5 MG/ML
INJECTION, SOLUTION EPIDURAL; INFILTRATION; INTRACAUDAL; PERINEURAL
Status: DISCONTINUED
Start: 2025-02-28 | End: 2025-02-28 | Stop reason: HOSPADM

## 2025-02-28 RX ORDER — CHLORHEXIDINE GLUCONATE ORAL RINSE 1.2 MG/ML
10 SOLUTION DENTAL
Status: DISCONTINUED | OUTPATIENT
Start: 2025-02-28 | End: 2025-02-28 | Stop reason: HOSPADM

## 2025-02-28 RX ORDER — MIDAZOLAM HYDROCHLORIDE 1 MG/ML
INJECTION INTRAMUSCULAR; INTRAVENOUS
Status: DISCONTINUED | OUTPATIENT
Start: 2025-02-28 | End: 2025-02-28

## 2025-02-28 RX ORDER — LIDOCAINE HYDROCHLORIDE 20 MG/ML
INJECTION, SOLUTION EPIDURAL; INFILTRATION; INTRACAUDAL; PERINEURAL
Status: DISCONTINUED | OUTPATIENT
Start: 2025-02-28 | End: 2025-02-28

## 2025-02-28 RX ORDER — LIDOCAINE HYDROCHLORIDE 10 MG/ML
INJECTION, SOLUTION EPIDURAL; INFILTRATION; INTRACAUDAL; PERINEURAL
Status: DISCONTINUED
Start: 2025-02-28 | End: 2025-02-28 | Stop reason: HOSPADM

## 2025-02-28 RX ORDER — GLUCAGON 1 MG
1 KIT INJECTION
Status: DISCONTINUED | OUTPATIENT
Start: 2025-02-28 | End: 2025-02-28 | Stop reason: HOSPADM

## 2025-02-28 RX ORDER — FENTANYL CITRATE 50 UG/ML
25 INJECTION, SOLUTION INTRAMUSCULAR; INTRAVENOUS EVERY 5 MIN PRN
Status: DISCONTINUED | OUTPATIENT
Start: 2025-02-28 | End: 2025-02-28 | Stop reason: HOSPADM

## 2025-02-28 RX ORDER — ONDANSETRON HYDROCHLORIDE 2 MG/ML
4 INJECTION, SOLUTION INTRAVENOUS DAILY PRN
Status: DISCONTINUED | OUTPATIENT
Start: 2025-02-28 | End: 2025-02-28 | Stop reason: HOSPADM

## 2025-02-28 RX ORDER — BUPIVACAINE HYDROCHLORIDE 2.5 MG/ML
INJECTION, SOLUTION EPIDURAL; INFILTRATION; INTRACAUDAL; PERINEURAL
Status: DISCONTINUED | OUTPATIENT
Start: 2025-02-28 | End: 2025-02-28 | Stop reason: HOSPADM

## 2025-02-28 RX ORDER — OXYCODONE AND ACETAMINOPHEN 5; 325 MG/1; MG/1
1 TABLET ORAL
Status: DISCONTINUED | OUTPATIENT
Start: 2025-02-28 | End: 2025-02-28 | Stop reason: HOSPADM

## 2025-02-28 RX ORDER — HYDROCODONE BITARTRATE AND ACETAMINOPHEN 5; 325 MG/1; MG/1
1 TABLET ORAL EVERY 4 HOURS PRN
Status: DISCONTINUED | OUTPATIENT
Start: 2025-02-28 | End: 2025-02-28 | Stop reason: HOSPADM

## 2025-02-28 RX ORDER — PROPOFOL 10 MG/ML
VIAL (ML) INTRAVENOUS
Status: DISCONTINUED | OUTPATIENT
Start: 2025-02-28 | End: 2025-02-28

## 2025-02-28 RX ORDER — LIDOCAINE HYDROCHLORIDE 10 MG/ML
INJECTION, SOLUTION EPIDURAL; INFILTRATION; INTRACAUDAL; PERINEURAL
Status: DISCONTINUED | OUTPATIENT
Start: 2025-02-28 | End: 2025-02-28 | Stop reason: HOSPADM

## 2025-02-28 RX ORDER — FENTANYL CITRATE 50 UG/ML
INJECTION, SOLUTION INTRAMUSCULAR; INTRAVENOUS
Status: DISCONTINUED | OUTPATIENT
Start: 2025-02-28 | End: 2025-02-28

## 2025-02-28 RX ADMIN — PROPOFOL 20 MG: 10 INJECTION, EMULSION INTRAVENOUS at 10:02

## 2025-02-28 RX ADMIN — PROPOFOL 10 MG: 10 INJECTION, EMULSION INTRAVENOUS at 10:02

## 2025-02-28 RX ADMIN — PROPOFOL 30 MG: 10 INJECTION, EMULSION INTRAVENOUS at 10:02

## 2025-02-28 RX ADMIN — MIDAZOLAM 2 MG: 1 INJECTION INTRAMUSCULAR; INTRAVENOUS at 10:02

## 2025-02-28 RX ADMIN — FENTANYL CITRATE 50 MCG: 50 INJECTION, SOLUTION INTRAMUSCULAR; INTRAVENOUS at 10:02

## 2025-02-28 RX ADMIN — LIDOCAINE HYDROCHLORIDE 80 MG: 20 INJECTION, SOLUTION EPIDURAL; INFILTRATION; INTRACAUDAL; PERINEURAL at 10:02

## 2025-02-28 RX ADMIN — PROPOFOL 100 MG: 10 INJECTION, EMULSION INTRAVENOUS at 10:02

## 2025-02-28 RX ADMIN — SODIUM CHLORIDE, POTASSIUM CHLORIDE, SODIUM LACTATE AND CALCIUM CHLORIDE: 600; 310; 30; 20 INJECTION, SOLUTION INTRAVENOUS at 10:02

## 2025-02-28 RX ADMIN — CEFAZOLIN 2 G: 330 INJECTION, POWDER, FOR SOLUTION INTRAMUSCULAR; INTRAVENOUS at 10:02

## 2025-02-28 NOTE — DISCHARGE INSTRUCTIONS
Discharge Instructions     Patient Name: Vinayak Turcois  Procedure: Procedure(s) (LRB):  DEBRIDEMENT, OPEN FRACTURE, HAND, PHALANGES (Left)  REPAIR, NAIL BED (Left)  OPEN REDUCTION, FRACTURE, PHALANX, FINGER (Left)     Surgeon: TAWANNA Owens M.D.     Date of Surgery: 2/28/2025      Wound Care: Keep incision clean and dry until follow up. Do not remove dressing.   Do not get dressing wet!  Weight Bearing Status: Non-weight bearing to the operative extremity.  Activity: Please keep your operative arm elevated.  Please flex and extend your exposed fingers several times per hour.  This will help reduce swelling at the operative site. If the fingers are getting stiff move them more often.  Medication: Take these medications as directed. You should take pain medications with food.    Current Discharge Medication List          While on opioid (narcotic) pain medication, please refrain from:  Driving  Operating Heavy Machinery/Power Tools  Drinking alcohol  All narcotics can cause some degree of itching, sedation, constipation and nausea. You should take stool softeners to prevent constipation. These can be purchased over the counter.   Prescription refill requests are only accepted during normal business hours (Monday-Friday 8am-4pm) and may take up to 48 hours to fill.     If you have any of the following signs or symptoms please proceed to your nearest Emergency Room:   Chest Pain  Shortness of Breath/ Difficulty Breathing  Excessive Bleeding    Please call the office if you have the following:   Excessive swelling that doesn't improve with elevation  Foul smelling odor from your wounds or dressings  Discharge from your surgical wounds  Persistent pain that does not get better with the prescription pain medication & elevation  Persistent nausea and/or vomiting  Fevers greater than 101.1 after the first 48 hours post op  Dramatic increase in post-operative pain    TAWANNA Owens M.D.  [facility] Department of Orthopedic  Surgery  Orthopedic Hand and Wrist Surgeon    Arpita Nelson Hand Specialist  Dr. Clark Owens   Google Review   [employer]grades   [employer]

## 2025-02-28 NOTE — OP NOTE
TAWANNA Owens M.D.  Orthopaedic Hand and Wrist Surgery  [facility]    OPERATIVE REPORT    Procedure Date: 2/28/2025     Patient Name: Vinayak Turcios     YOB: 1970    Pre-Operative Diagnosis:  Open displaced fracture of distal phalanx of left thumb, initial encounter [S62.522B]  Laceration of left thumb without foreign body with damage to nail, initial encounter [S61.112A]  Work related injury [Y99.0]     Post-Operative Diagnosis:  Post-Op Diagnosis Codes:     * Open displaced fracture of distal phalanx of left thumb, initial encounter [S62.522B]     * Laceration of left thumb without foreign body with damage to nail, initial encounter [S61.112A]     * Work related injury [Y99.0]     Procedure Performed:  Open treatment left thumb distal phalanx fracture 39337  Left thumb nailbed repair 27443  Debridement site of open fracture left thumb excisional including bone 49090    Surgeon: TAWANNA Owens MD    Assistant: None    Anesthesia: Monitor Anesthesia Care    Fluids: See Anesthesia Record    Urine Output: See Anesthesia Record    Estimated Blood Loss: * No values recorded between 2/28/2025 10:05 AM and 2/28/2025 10:38 AM *    Implants: * No implants in log *    Specimens:   Specimen (24h ago, onward)      None             Drains: None    Tourniquet Time: * No tourniquets in log *     Complications: No    Fluoro/C-arm utilized during the case: Yes - adequate alignment of the distal phalanx fracture no need for wire fixation    Loupes/Microscope: 3.5x Loupe magnification was utilized for this case    Indications for Procedure and Brief History:  Vinayak Turcios is a 54 y.o. male here today who crushed his left thumb with a wrench he was seen yesterday with an open distal phalanx fracture he has a history of diabetes he elected proceed with treatment    I had a long discussion with the patient about treatment and diagnostic options. We discussed operative and non-operative options and  expected outcomes of their diagnosis and co-morbidities. We discussed the risks and benefits of surgery at length, including but not limited to pain, infection, bleeding, damage to adjacent structures such as nerves and blood vessels, failure of appropriate healing, stiffness, scarring, laxity, need for more surgery, stroke, blood clot, loss of life, loss of limb, need for removal of any implants used, anesthesia risks, breathing problems, and heart problems. We talked about common and uncommon risks. We discussed risks that were higher specific to the patient and their co-morbid conditions.  We discussed expected treatment outcomes in regards to pain, function and the possibility of permanent impairment.  They expressed understanding of the risks and benefits an opted to proceed with surgical management.  The patient was consented and marked prior to transfer to the operating room.    Intra-Operative Findings:  There is a tuft fracture of the distal phalanx after nail bed repair there was good alignment no need for wire fixation    Description of Procedure: I met the patient in the preoperative holding area. I identified, confirmed, and marked the operative extremity. All questions were answered. The patient was then taken back to the operating room and transferred to the operative table. The patient was placed in the supine position. All bony prominences were padded. The operative extremity was then prepped and draped in the standard sterile fashion with alcohol and chlorhexidine solutions. A timeout was performed to ensure we had the proper patient, proper operative site, and were performing the proper procedure. All members of the operative team were in agreement with this.     A metacarpal level nerve block was performed of the left thumb after adequate anesthesia a Penrose drain was placed in the base of the left thumb a Ames elevator was used to remove the unstable nail plate this gave us good access of the  nail bed laceration in the fracture site itself the fracture site was debrided excisionally of the bone of the tuft after debridement the wound was copiously irrigated.  Followed by closure of the nail bed laceration with 5 0 plain gut x-rays confirmed adequate reduction of the distal phalanx there was good alignment no need of wire fixation Xeroform was placed over the wound followed by soft dressing there was good brisk cap refill tip of the finger after Penrose drain removal.    All sponge, instrument, and needle counts were correct at the conclusion of the case.      Condition: Good    Disposition: PACU - hemodynamically stable.    Attestation: I was present and scrubbed for the entire procedure.    Post-Operative Exam:  The patient was examined post-operatively and the limb was warm and well perfused with appropriate neurovascular status relative to preoperative block status. The dressing was intact.      TAWANNA Owens MD  Orthopaedic Hand and Wrist Surgery

## 2025-02-28 NOTE — TRANSFER OF CARE
"Anesthesia Transfer of Care Note    Patient: Vinayak Turciso    Procedure(s) Performed: Procedure(s) (LRB):  DEBRIDEMENT, OPEN FRACTURE, HAND, PHALANGES (Left)  REPAIR, NAIL BED (Left)  OPEN REDUCTION, FRACTURE, PHALANX, FINGER (Left)    Patient location: PACU    Anesthesia Type: MAC    Transport from OR: Transported from OR on room air with adequate spontaneous ventilation    Post pain: adequate analgesia    Post assessment: no apparent anesthetic complications and tolerated procedure well    Post vital signs: stable    Level of consciousness: awake and alert    Nausea/Vomiting: no nausea/vomiting    Complications: none    Transfer of care protocol was followed      Last vitals: Visit Vitals  BP (!) 194/95 (BP Location: Right arm, Patient Position: Sitting)   Pulse 70   Temp 36.4 °C (97.5 °F) (Temporal)   Resp 16   Ht 6' 1" (1.854 m)   Wt 106.3 kg (234 lb 5.6 oz)   SpO2 100%   BMI 30.92 kg/m²     "

## 2025-02-28 NOTE — ANESTHESIA POSTPROCEDURE EVALUATION
Anesthesia Post Evaluation    Patient: Vinayak Turcios    Procedure(s) Performed: Procedure(s) (LRB):  DEBRIDEMENT, OPEN FRACTURE, HAND, PHALANGES (Left)  REPAIR, NAIL BED (Left)  OPEN REDUCTION, FRACTURE, PHALANX, FINGER (Left)    Final Anesthesia Type: MAC      Patient location during evaluation: PACU  Patient participation: Yes- Able to Participate  Level of consciousness: awake  Post-procedure vital signs: reviewed and stable  Pain management: adequate  Airway patency: patent    PONV status at discharge: No PONV  Anesthetic complications: no      Cardiovascular status: stable  Respiratory status: unassisted  Hydration status: euvolemic  Follow-up not needed.              Vitals Value Taken Time   /67 02/28/25 10:57   Temp 36.8 °C (98.3 °F) 02/28/25 10:40   Pulse 55 02/28/25 10:57   Resp 13 02/28/25 10:57   SpO2 98 % 02/28/25 10:57   Vitals shown include unfiled device data.      No case tracking events are documented in the log.      Pain/Edilia Score: Edilia Score: 10 (2/28/2025 10:55 AM)

## 2025-02-28 NOTE — DISCHARGE SUMMARY
[facility]  Discharge Note  Short Stay    Procedure(s) (LRB):  DEBRIDEMENT, OPEN FRACTURE, HAND, PHALANGES (Left)  REPAIR, NAIL BED (Left)  OPEN REDUCTION, FRACTURE, PHALANX, FINGER (Left)      OUTCOME: Patient tolerated treatment/procedure well without complication and is now ready for discharge.    DISPOSITION: Home or Self Care    FINAL DIAGNOSIS:  <principal problem not specified>    FOLLOWUP: In clinic    DISCHARGE INSTRUCTIONS:    Discharge Procedure Orders   Ambulatory referral/consult to Physical/Occupational Therapy   Standing Status: Future   Referral Priority: Routine Referral Type: Physical Medicine   Referral Reason: Specialty Services Required   Number of Visits Requested: 1     Diet general     Keep surgical extremity elevated     Lifting restrictions     No driving, operating heavy equipment or signing legal documents while taking pain medication.     Leave dressing on - Keep it clean, dry, and intact until clinic visit     Call MD for:  temperature >100.4     Call MD for:  persistent nausea and vomiting     Call MD for:  severe uncontrolled pain     Call MD for:  difficulty breathing, headache or visual disturbances     Call MD for:  redness, tenderness, or signs of infection (pain, swelling, redness, odor or green/yellow discharge around incision site)     Call MD for:  hives     Call MD for:  persistent dizziness or light-headedness     Call MD for:  extreme fatigue        TIME SPENT ON DISCHARGE: 5 minutes

## 2025-02-28 NOTE — ANESTHESIA PREPROCEDURE EVALUATION
02/28/2025  Vinayak Turcios is a 54 y.o., male.    Past Surgical History:   Procedure Laterality Date    APPENDECTOMY      COLONOSCOPY       History reviewed. No pertinent past medical history.    Pre-op Assessment    I have reviewed the Patient Summary Reports.    I have reviewed the NPO Status.   I have reviewed the Medications.     Review of Systems  Anesthesia Hx:   History of prior surgery of interest to airway management or planning:             Social:  Smoker       Cardiovascular:     Hypertension           hyperlipidemia                               Pulmonary:  Pulmonary Normal                       Hepatic/GI:     GERD                Endocrine:  Diabetes           Psych:    depression                Physical Exam  General: Well nourished    Airway:  Mallampati: II   Mouth Opening: Normal  TM Distance: Normal  Tongue: Normal, Large  Neck ROM: Normal ROM        Anesthesia Plan  Type of Anesthesia, risks & benefits discussed:    Anesthesia Type: Gen Natural Airway, MAC  Intra-op Monitoring Plan: Standard ASA Monitors  Post Op Pain Control Plan: multimodal analgesia and IV/PO Opioids PRN  Induction:  IV  Informed Consent: Informed consent signed with the Patient representative and all parties understand the risks and agree with anesthesia plan.  All questions answered. Patient consented to blood products? No  ASA Score: 2  Day of Surgery Review of History & Physical: H&P Update referred to the surgeon/provider.    Ready For Surgery From Anesthesia Perspective.     .

## 2025-03-03 ENCOUNTER — TELEPHONE (OUTPATIENT)
Dept: SPORTS MEDICINE | Facility: CLINIC | Age: 55
End: 2025-03-03
Payer: COMMERCIAL

## 2025-03-03 VITALS
HEIGHT: 73 IN | DIASTOLIC BLOOD PRESSURE: 67 MMHG | HEART RATE: 54 BPM | BODY MASS INDEX: 31.06 KG/M2 | SYSTOLIC BLOOD PRESSURE: 108 MMHG | TEMPERATURE: 98 F | OXYGEN SATURATION: 95 % | WEIGHT: 234.38 LBS | RESPIRATION RATE: 16 BRPM

## 2025-03-03 DIAGNOSIS — S62.522B: Primary | ICD-10-CM

## 2025-03-03 NOTE — TELEPHONE ENCOUNTER
Called and left voicemail for patient to call back to schedule post op visit as well as set up therapy.     ----- Message from Benjamin sent at 2/28/2025  3:46 PM CST -----  Contact: janelle/ wife  Type:  Post Op Apoointment RequestName of Caller:Angi is the first available appointment?Symptoms:post-opWould the patient rather a call back or a response via [facility]? Call [facility] Call Back Number:[phone] Information:

## 2025-03-11 ENCOUNTER — TELEPHONE (OUTPATIENT)
Dept: ORTHOPEDICS | Facility: CLINIC | Age: 55
End: 2025-03-11
Payer: COMMERCIAL

## 2025-03-12 ENCOUNTER — HOSPITAL ENCOUNTER (OUTPATIENT)
Dept: RADIOLOGY | Facility: HOSPITAL | Age: 55
Discharge: HOME | End: 2025-03-12
Attending: ORTHOPAEDIC SURGERY
Payer: COMMERCIAL

## 2025-03-12 ENCOUNTER — CLINICAL SUPPORT (OUTPATIENT)
Dept: REHABILITATION | Facility: HOSPITAL | Age: 55
End: 2025-03-12
Attending: ORTHOPAEDIC SURGERY
Payer: OTHER MISCELLANEOUS

## 2025-03-12 ENCOUNTER — OFFICE VISIT (OUTPATIENT)
Dept: ORTHOPEDICS | Facility: CLINIC | Age: 55
End: 2025-03-12
Payer: COMMERCIAL

## 2025-03-12 DIAGNOSIS — S62.522B OPEN DISPLACED FRACTURE OF DISTAL PHALANX OF LEFT THUMB, INITIAL ENCOUNTER: ICD-10-CM

## 2025-03-12 DIAGNOSIS — S62.522B OPEN DISPLACED FRACTURE OF DISTAL PHALANX OF LEFT THUMB, INITIAL ENCOUNTER: Primary | ICD-10-CM

## 2025-03-12 DIAGNOSIS — S62.522D OPEN DISPLACED FRACTURE OF DISTAL PHALANX OF LEFT THUMB WITH ROUTINE HEALING, SUBSEQUENT ENCOUNTER: Primary | ICD-10-CM

## 2025-03-12 PROCEDURE — 4010F ACE/ARB THERAPY RXD/TAKEN: CPT | Mod: CPTII,S$GLB,, | Performed by: ORTHOPAEDIC SURGERY

## 2025-03-12 PROCEDURE — 97110 THERAPEUTIC EXERCISES: CPT

## 2025-03-12 PROCEDURE — 73140 X-RAY EXAM OF FINGER(S): CPT | Mod: 26,LT,, | Performed by: RADIOLOGY

## 2025-03-12 PROCEDURE — 97165 OT EVAL LOW COMPLEX 30 MIN: CPT

## 2025-03-12 PROCEDURE — 97535 SELF CARE MNGMENT TRAINING: CPT

## 2025-03-12 PROCEDURE — 1159F MED LIST DOCD IN RCRD: CPT | Mod: CPTII,S$GLB,, | Performed by: ORTHOPAEDIC SURGERY

## 2025-03-12 PROCEDURE — 73140 X-RAY EXAM OF FINGER(S): CPT | Mod: TC,LT

## 2025-03-12 PROCEDURE — 99999 PR PBB SHADOW E&M-EST. PATIENT-LVL II: CPT | Mod: PBBFAC,,, | Performed by: ORTHOPAEDIC SURGERY

## 2025-03-12 PROCEDURE — 99024 POSTOP FOLLOW-UP VISIT: CPT | Mod: S$GLB,,, | Performed by: ORTHOPAEDIC SURGERY

## 2025-03-12 NOTE — PROGRESS NOTES
TAWANNA Owens M.D.  Orthopaedic Hand and Wrist Surgery  [facility]    Patient ID: Vinayak Turcios  YOB: 1970  MRN: [id number]    Date of Surgery:  2025    Procedure Performed:   Debridement, Open Fracture, Hand, Phalanges - Left, Repair, Nail Bed - Left, and Open Reduction, Fracture, Phalanx, Finger - Left    History of Present Illness: Vinayak Turcios is a 54 y.o. male status post debridement and nailbed repair of his left thumb he is doing well he has his 1st therapy visit today.    Patient was queried and this is the extent of the patients current complaints today.    Physical Exam:   Wound:  Healing well no signs of infection  Sensation:  Baseline decreased sensation tip of the thumb as expected  Motor:  Intact EPL FPL with thumb IP joint range of motion from 0-20 degrees  Swelling:  As expected  No erythema or signs of infection    Imaging:   Persistent lucency as expected at the tuft no evidence of osteomyelitis    Provider Note/Medical Decision Makin. Open displaced fracture of distal phalanx of left thumb with routine healing, subsequent encounter  Assessment & Plan:  Patient is doing well postop.  We will get him into therapy for range of motion.  He understands no pinching or gripping using his thumb  He understands the risks of infection nonunion and nail deformity  I will see him back in 4 weeks  We will let him go back to work light duty no use of the left thumb.             I discussed worrisome and red flag signs and symptoms with the patient. The patient expressed understanding and agreed to alert me immediately or to go to the emergency room if they experience any of these.   Treatment plan was developed with input from the patient/family, and they expressed understanding and agreement with the plan. All questions were answered today.    There are no Patient Instructions on file for this visit.    TAWANNA Owens M.D.  [facility]  Orthopedic Hand and Wrist Surgeon    [facility]  Dr. Clark Owens   [place] [employer]   [employer]   [place]     Disclaimer: This note was prepared using a voice recognition system and is likely to have sound alike errors within the text.

## 2025-03-12 NOTE — PROGRESS NOTES
[facility]  Occupational Therapy Initial Evaluation     Today's Date: 3/12/2025  Name: Vinayak Turcios  Clinic Number: [id number]    Therapy Diagnosis:   Encounter Diagnosis   Name Primary?    Open displaced fracture of distal phalanx of left thumb, initial encounter      Physician: Reno Owens MD    Physician Orders: OT eval and tx  Medical Diagnosis: Open displaced fracture of distal phalanx of left thumb, initial encounter [S62.522B]   MD Order Comments: Eval and treat    Evaluation Date: 3/12/2025  Insurance Authorization Period: 2/28/2025 to 2/28/2026  Plan of Care Certification Period: 3/12/2025 to 4/23/2025  Progress Note Due: 30 days (~4/9/2025)     Visit # / Visits authorized: 1/1  FOTO: 1/3  (Initial Score = 27% / Predicted Goal Score = 58%)    Surgical Procedure: Open treatment left thumb distal phalanx fracture, Left thumb nailbed repair, Debridement site of open fracture left thumb excisional including bone   Date of Surgery: 2/28/2025     Date of Return to MD: 3/12/2025    Precautions:  Standard; pt completed 1 weeks post-op on 3/7/2025    Time In: 09:05  Time Out: 10:00  Total Appointment Time: 55 minutes  Total Billable Time: 55 minutes    Subjective     Involved Side: left  Dominant Side: Right    Date of Onset: 2/26/2025  Mechanism of Injury/ History of Current Condition: pipe wrench slipped while tightening a valve and slipped hitting left thumb.  Saw company doctor and referred to Dr. Owens with surgery the next day.     Per MD conversation on 3/12/2025:  clear for full thumb AROM    Imaging: updated imaging post OT eval    Previous Therapy: none    Patient's Goal for Therapy: get this thing to working again    Pain:  Functional Pain Scale Rating 0-10:   7/10 on average  0/10 at best  8/10 at worst  Location: top/tip of left thumb  Description: Throbbing  Aggravating Factors: Flexing  Easing Factors: rest    Functional Limitations/Social History:    Previous  Functional Status:  Independent with all ADL/IADL tasks     Current Functional Status: moderate limitations to use left hand and     Home/Living environment: lives with their spouse     Driving: not driving currently due to thumb    Leisure: Fishing    Occupation:   Working presently: employed full-time, currently off work  Duties: use hand tools, large pipe wrenches, impact wrenches; lifts 50-75#         Past Medical History/Physical Systems Review:   Medical History:   No past medical history on file.    Surgical History:    has a past surgical history that includes Colonoscopy; Appendectomy; Debridement of open fracture of phalanx of hand (Left, 2/28/2025); Repair of nail bed (Left, 2/28/2025); and open reduction, fracture, phalanx, finger (Left, 2/28/2025).    Medications:   has a current medication list which includes the following prescription(s): atorvastatin, losartan, pantoprazole, sertraline, sitagliptin phosphate, and trazodone.    Allergies:   Review of patient's allergies indicates:  No Known Allergies       Objective     Observation/Inspection: Joint stiffness and Edema present    Sensation: Pt denies paresthesias. Intact to light touch. Hypersensitivity reported at dorsal thumb nail bed.     Edema: Circumferential measurements (in centimeters)   Right Left    3/12/2025 3/12/2025   Thumb Proximal Phalanx  8.2 8.5   Thumb IP Joint 8.6 8.8         left Hand Active Range of Motion: WNL throughout    left Thumb Active Range of Motion:   (Ext/Flex) 3/12/2025 3/12/2025   MCP Jt 0/40 0/29°   IP Jt +20/62 +10/16°   PHIPPS 122 55   Kapandji Score 10/10 7/10   Palmar Abd 65 65°   Radial Abd 60 55°                                                             and Pinch Strength: Not tested at this time 2* post-op status         Intake Outcome Measure for FOTO Hand Survey    Therapist reviewed FOTO scores for Vinayak DOYLE Turcios on 3/12/2025.   FOTO documents entered into EPIC - see Media section.    Intake  Score: 27%     Predicted Functional Score: 58%     Treatment     Total Treatment time (time-based codes) separate from Evaluation: 45 minutes    Vinayak received the treatments listed below:      therapeutic exercises to develop ROM for 30 minutes including:  - girth, ROM, FOTO measures w/ review of status and discussion of treatment plan  - thumb ROM all planes w/ HEP instruction    self-care techniques to improve independence and safety with ADL/IADL tasks for 15 minutes including:  - dressings removed w/ temporary dressing re-applied prior to MD appointment    neuromuscular re-education activities to improve Coordination, Sense, and Proprioception for 0 minutes including:  - NT    therapeutic activities to improve functional performance for 0  minutes including:  - NT    Education:  Education provided:   - HEP  - Role of OT, goals for OT, scheduling/cancellations, therapy attendance policy    Education Recipient:  [x] Patient [] Other recipient present    Patient Learning Style:  [x] Demonstration [x] Listening [] Pictures/video [] Reading [] Tactile    Patient Response to Education:  [x] Demonstrates understanding [x] Verbalizes understanding    [] Requires assistance [] Requires continuing/additional education    Written Home Exercises Provided: Yes  See EMR under Patient Instructions for exercises provided during therapy sessions.     Assessment   Assessment  Vinayak presents with a condition of Low complexity.   Presentation of Symptoms: Stable  Will Comorbidities Impact Care: No       ADL Limitations : Bathing/showering, Dressing, Personal hygiene and grooming, Toileting and toilet hygiene  IADL Limitations: Home establishment and management, Driving  Work Limitations: Job performance  Functional Limitations: Carrying objects, Fine motor coordination, Manipulating objects, Range of motion                 Evaluation/Treatment Response: Patient responded to treatment well  Prognosis: Excellent  Prognosis Details:  Anticipated barriers to occupational therapy: co-morbid conditions; consistent compliance to HEP and therapy attendance as recommended   Assessment Details: Vinayak Turcios is a 54 y.o. male referred to outpatient occupational therapy and presents with a medical diagnosis of Open displaced fracture of distal phalanx of left thumb, initial encounter, S62.561O. Patient presents with the following therapy deficits: Decreased ROM, Decreased  strength, Decreased pinch strength, Decreased functional hand use, Increased pain, and Edema. Following medical record review, it is determined that the pt will benefit from occupational therapy services in order to maximize pain free and/or functional use of his left thumb/hand. The following goals were discussed with the patient and patient is in agreement with them as to be addressed in the treatment plan.       Plan  From an occupational therapy perspective, the patient would benefit from: Skilled Rehab Services    Planned therapy interventions include: Therapeutic exercise, Therapeutic activities, Neuromuscular re-education, Manual therapy, Orthotic management and training, and ADLs/IADLs.    Planned modalities to include: Fluidotherapy, Paraffin bath, and Ultrasound.        Visit Frequency: 2 times Per Week for 6 Weeks.       This plan was discussed with Patient.   Discussion participants: Agreed Upon Plan of Care             Goals:   Active       Functional outcome       LTG: Pt will report an increase in FOTO intake score of > 70%, which would indicate an improvement in quality of life.        Start:  03/12/25    Expected End:  04/23/25            STG: Pt will report an increase in FOTO intake score of > 50%, which would indicate an improvement in quality of life.        Start:  03/12/25    Expected End:  04/09/25            LTG & STG: Patient will demonstrate independence in home program for support of progression       Start:  03/12/25    Expected End:  04/23/25                Lifting & carrying objects       LTG: Patient will lift/handle up to 50# to allow ability to return to work duties       Start:  03/12/25    Expected End:  04/23/25            STG: Patient will lift/handle up to 25# to improve functional ability to perform light work activities       Start:  03/12/25    Expected End:  04/09/25               Pain       LTG: Pt will report decreased pain to a 1-2/10 with ADL/IADL tasks.         Start:  03/12/25    Expected End:  04/23/25            STG: Pt will report decreased pain to a 4/10 with ADL/IADL tasks.        Start:  03/12/25    Expected End:  04/09/25               Range of Motion       LTG: Patient will increase left thumb PHIPPS by 50 degrees to improve ability to perform ADL/IADL        Start:  03/12/25    Expected End:  04/23/25            STG: Patient will increase left thumb ROM by 15 degrees to improve ability to perform self-care and light prehension activities       Start:  03/12/25    Expected End:  04/09/25               Strength       STG: Pt will tolerate /pinch assessment and strengthening once allowed post-operatively.        Start:  03/12/25    Expected End:  04/09/25            LTG: Pt will exhibit left  strength that is within 70% of right  strength to enable holding cooking utensils and pots/pans.        Start:  03/12/25    Expected End:  04/23/25            LTG: Pt will exhibit 9-12# of left functional lateral pinch strength to enable use of utensils, opening containers, and turning keys.        Start:  03/12/25    Expected End:  04/23/25                  AGATA Nazario, JOSHT

## 2025-03-12 NOTE — ASSESSMENT & PLAN NOTE
Patient is doing well postop.  We will get him into therapy for range of motion.  He understands no pinching or gripping using his thumb  He understands the risks of infection nonunion and nail deformity  I will see him back in 4 weeks  We will let him go back to work light duty no use of the left thumb.

## 2025-03-17 ENCOUNTER — CLINICAL SUPPORT (OUTPATIENT)
Dept: REHABILITATION | Facility: HOSPITAL | Age: 55
End: 2025-03-17
Payer: COMMERCIAL

## 2025-03-17 DIAGNOSIS — R29.898 DECREASED GRIP STRENGTH OF LEFT HAND: ICD-10-CM

## 2025-03-17 DIAGNOSIS — M25.649 THUMB JOINT STIFFNESS: Primary | ICD-10-CM

## 2025-03-17 DIAGNOSIS — M79.645 THUMB PAIN, LEFT: ICD-10-CM

## 2025-03-17 PROCEDURE — 97110 THERAPEUTIC EXERCISES: CPT

## 2025-03-17 PROCEDURE — 97530 THERAPEUTIC ACTIVITIES: CPT

## 2025-03-17 PROCEDURE — 97010 HOT OR COLD PACKS THERAPY: CPT

## 2025-03-17 NOTE — PROGRESS NOTES
[facility]  Occupational Therapy Treatment Note     Today's Date: 3/17/2025  Name: Vinayak Turcios  Clinic Number: [id number]    Therapy Diagnosis:   Encounter Diagnoses   Name Primary?    Thumb joint stiffness Yes    Thumb pain, left     Decreased  strength of left hand      Physician: Reno Owens MD    Physician Orders: OT eval and tx  Medical Diagnosis: Open displaced fracture of distal phalanx of left thumb, initial encounter [S62.525N]   MD Order Comments: Eval and treat     Evaluation Date: 3/12/2025  Insurance Authorization Period: 3/12/2025 to 12/31/2025  Plan of Care Certification Period: 3/12/2025 to 4/23/2025  Progress Note Due: 30 days (~4/9/2025)      Visit # / Visits authorized: 1/20  FOTO: 1/3  (Initial Score = 27% / Predicted Goal Score = 58%)     Surgical Procedure: Open treatment left thumb distal phalanx fracture, Left thumb nailbed repair, Debridement site of open fracture left thumb excisional including bone   Date of Surgery: 2/28/2025                             Date of Return to MD: 3/12/2025     Precautions:  Standard; pt completed 2 weeks post-op on 3/14/2025    Time In: 07:30  Time Out: 08:18  Total Time: 48 minutes  Total Billable Time: 48 minutes    Subjective     Pt reports: wrapped it for the swelling through the weekend, but has been off so I am moving it more.  It is still pretty tender with any pressure on the tip of my thumb.    he was compliant with home exercise program.  Response to previous treatment: more movement, less swelling  Functional change: using my other fingers to grab things, but hurts to put pressure on the tip    Pain: 5/10 (8/10 on evaluation)  Location: distal thumb    Objective   Objective measures updated at initial evaluation unless specified.    Edema: Circumferential measurements (in centimeters)    Right Left Left     3/12/2025 3/12/2025 3/17/2025   Thumb Proximal Phalanx  8.2 8.5 8.2   Thumb IP Joint 8.6 8.8 8.5      Treatment     Vinayak received the treatments listed below:      therapeutic exercises to develop strength, endurance, and ROM of left thumb/hand for 25 minutes including:  - reviewed HEP issued at evaluation  - girth,    - thumb composite flexion, isolated IP flexion, palmar abduction, 3x10 ea  - compression wrap w/ rubber covering to thumb post-tx to protect in work environment    therapeutic activities to improve functional performance of left thumb/hand for 15 minutes including:  - thumb pressing, pink foam, 3x10  - gripping, isolated & composite, green Digi-flex, 3x10 ea    manual therapy techniques were applied to left thumb/hand for 0 minutes including:  - NT    neuromuscular re-education activities to improve Coordination and Proprioception of left thumb/hand for 0 minutes including:  - NT    self-care techniques to improve independence and safety with ADL/IADL tasks for 0 minutes including:  - NT    direct contact modalities after being cleared for contraindications for 0 minutes including:  - NT    supervised modalities after being cleared for contradictions for 8 minutes including:  - MHP to left thumb/hand, pre-tx, to decrease pain and increase tissue extensibility    Home Exercises and Education Provided     Education provided:   - reviewed HEP issued at evaluation  - progress toward goals    Education Recipient:  [x] Patient [] Other recipient present    Patient Learning Style:  [x] Demonstration [x] Listening [] Pictures/video [] Reading [] Tactile    Patient Response to Education:  [x] Demonstrates understanding [x] Verbalizes understanding    [] Requires assistance [] Requires continuing/additional education    Written Home Exercises Provided: Patient instructed to continue previously-issued HEP.  See EMR under Patient Instructions for exercises provided during therapy sessions.      Assessment     Vinaayk was seen for his first tx session since initial evaluation on 3/12/2025.  Demonstrates  decreasing thumb edema with increasing thumb IP flexion active range of motion.  Demonstrates appropriate follow through of HEP issued at initial evaluation.  Patient concerned with protecting thumb in work environment due to metal shards.      Vinayak is progressing towards his goals and there are no updates to goals at this time. Pt prognosis is Excellent.     Vinayak will continue to benefit from skilled outpatient occupational therapy services to address the deficits listed in the problem list on initial evaluation, to provide pt/family education, and to maximize pt's level of independence in the home and community environment.     Pt's spiritual, cultural and educational needs considered and pt agreeable to plan of care and goals.    Anticipated barriers to occupational therapy: co-morbid conditions; consistent compliance to HEP and therapy attendance as recommended     Goals:   Active       Functional outcome       LTG: Pt will report an increase in FOTO intake score of > 70%, which would indicate an improvement in quality of life.        Start:  03/12/25    Expected End:  04/23/25            STG: Pt will report an increase in FOTO intake score of > 50%, which would indicate an improvement in quality of life.        Start:  03/12/25    Expected End:  04/09/25            LTG & STG: Patient will demonstrate independence in home program for support of progression       Start:  03/12/25    Expected End:  04/23/25               Lifting & carrying objects       LTG: Patient will lift/handle up to 50# to allow ability to return to work duties       Start:  03/12/25    Expected End:  04/23/25            STG: Patient will lift/handle up to 25# to improve functional ability to perform light work activities       Start:  03/12/25    Expected End:  04/09/25               Pain       LTG: Pt will report decreased pain to a 1-2/10 with ADL/IADL tasks.         Start:  03/12/25    Expected End:  04/23/25            STG: Pt will  report decreased pain to a 4/10 with ADL/IADL tasks.        Start:  03/12/25    Expected End:  04/09/25               Range of Motion       LTG: Patient will increase left thumb PHIPPS by 50 degrees to improve ability to perform ADL/IADL        Start:  03/12/25    Expected End:  04/23/25            STG: Patient will increase left thumb ROM by 15 degrees to improve ability to perform self-care and light prehension activities       Start:  03/12/25    Expected End:  04/09/25               Strength       STG: Pt will tolerate /pinch assessment and strengthening once allowed post-operatively.        Start:  03/12/25    Expected End:  04/09/25            LTG: Pt will exhibit left  strength that is within 70% of right  strength to enable holding cooking utensils and pots/pans.        Start:  03/12/25    Expected End:  04/23/25            LTG: Pt will exhibit 9-12# of left functional lateral pinch strength to enable use of utensils, opening containers, and turning keys.        Start:  03/12/25    Expected End:  04/23/25                Plan     Continue occupational therapy services per Plan of Care set on evaluation.    Updates/Grading for next session: progress occupational therapy as tolerated    AGATA Nazario, CHT

## 2025-03-20 ENCOUNTER — CLINICAL SUPPORT (OUTPATIENT)
Dept: REHABILITATION | Facility: HOSPITAL | Age: 55
End: 2025-03-20
Payer: COMMERCIAL

## 2025-03-20 DIAGNOSIS — M79.645 THUMB PAIN, LEFT: ICD-10-CM

## 2025-03-20 DIAGNOSIS — R29.898 DECREASED GRIP STRENGTH OF LEFT HAND: ICD-10-CM

## 2025-03-20 DIAGNOSIS — M25.649 THUMB JOINT STIFFNESS: Primary | ICD-10-CM

## 2025-03-20 PROCEDURE — 97530 THERAPEUTIC ACTIVITIES: CPT

## 2025-03-20 PROCEDURE — 97110 THERAPEUTIC EXERCISES: CPT

## 2025-03-20 PROCEDURE — 97112 NEUROMUSCULAR REEDUCATION: CPT

## 2025-03-20 NOTE — PROGRESS NOTES
[facility]  Occupational Therapy Treatment Note     Today's Date: 3/20/2025  Name: Vinayak Turcios  Clinic Number: [id number]    Therapy Diagnosis:   Encounter Diagnoses   Name Primary?    Thumb joint stiffness Yes    Thumb pain, left     Decreased  strength of left hand      Physician: Reno Owens MD    Physician Orders: OT eval and tx  Medical Diagnosis: Open displaced fracture of distal phalanx of left thumb, initial encounter [S62.522B]   MD Order Comments: Eval and treat     Evaluation Date: 3/12/2025  Insurance Authorization Period: 3/12/2025 to 12/31/2025  Plan of Care Certification Period: 3/12/2025 to 4/23/2025  Progress Note Due: 30 days (~4/9/2025)      Visit # / Visits authorized: 2/20  FOTO: 1/3  (Initial Score = 27% / Predicted Goal Score = 58%)     Surgical Procedure: Open treatment left thumb distal phalanx fracture, Left thumb nailbed repair, Debridement site of open fracture left thumb excisional including bone   Date of Surgery: 2/28/2025                             Date of Return to MD: 3/12/2025     Precautions:  Standard; pt completes 3 weeks post-op on 3/21/2025    Time In: 15:00  Time Out: 15:55  Total Time: 55 minutes  Total Billable Time: 55 minutes    Subjective     Pt reports: I am working light duty at work and mostly just doing paper work.  I still have some tenderness on the underside of my thumb that bothers me when I put pressure on it.     he was compliant with home exercise program.  Response to previous treatment: more movement, less swelling  Functional change: using my other fingers to grab things, but hurts to put pressure on the tip    Pain: 5/10 (8/10 on evaluation)  Location: distal thumb    Objective   Objective measures updated at initial evaluation unless specified.     Thumb ROM: MP /45, IP /50 (initial visit /29, /16)  Treatment     Vinayak received the treatments listed below:      therapeutic exercises to develop strength,  endurance, and ROM of left thumb/hand for 20 minutes including:  - reviewed HEP issued at evaluation  - , ROM,  measures w/ review of status and discussion of treatment plan   - thumb composite flexion, isolated IP flexion, palmar abduction, 3x10 ea  - compression wrap     therapeutic activities to improve functional performance of left thumb/hand for 15 minutes including:  - thumb pressing, blue foam, 3x10  - gripping, isolated & composite, green Digi-flex, 4x10 ea      manual therapy techniques were applied to left thumb/hand for 0 minutes including:  - NT    neuromuscular re-education activities to improve Coordination and Proprioception of left thumb/hand for 20 minutes including:  - desensitization of left thumb w/ ROM control in Fluidotherapy, 75% air speed, 10 min  - thumb w/ IP flexion control to SF tip, sm beads    self-care techniques to improve independence and safety with ADL/IADL tasks for 0 minutes including:  - NT    direct contact modalities after being cleared for contraindications for 0 minutes including:  - NT    supervised modalities after being cleared for contradictions for 0 minutes including:  -   Home Exercises and Education Provided     Education provided:   - reviewed HEP issued at evaluation  - progress toward goals    Education Recipient:  [x] Patient [] Other recipient present    Patient Learning Style:  [x] Demonstration [x] Listening [] Pictures/video [] Reading [] Tactile    Patient Response to Education:  [x] Demonstrates understanding [x] Verbalizes understanding    [] Requires assistance [] Requires continuing/additional education    Written Home Exercises Provided: Patient instructed to continue previously-issued HEP.  See EMR under Patient Instructions for exercises provided during therapy sessions.      Assessment     Vinayak was seen for his 2nd tx session since initial evaluation on 3/12/2025.  Demonstrates increasing thumb active range of motion with 40 degree increase in PHIPPS  of flexion.  Initiated Fluidotherapy for desensitization and ROM during heat.      Vinayak is progressing towards his goals and there are no updates to goals at this time. Pt prognosis is Excellent.     Vinayak will continue to benefit from skilled outpatient occupational therapy services to address the deficits listed in the problem list on initial evaluation, to provide pt/family education, and to maximize pt's level of independence in the home and community environment.     Pt's spiritual, cultural and educational needs considered and pt agreeable to plan of care and goals.    Anticipated barriers to occupational therapy: co-morbid conditions; consistent compliance to HEP and therapy attendance as recommended     Goals:   Active       Functional outcome       LTG: Pt will report an increase in FOTO intake score of > 70%, which would indicate an improvement in quality of life.  (Progressing)       Start:  03/12/25    Expected End:  04/23/25            STG: Pt will report an increase in FOTO intake score of > 50%, which would indicate an improvement in quality of life.  (Progressing)       Start:  03/12/25    Expected End:  04/09/25            LTG & STG: Patient will demonstrate independence in home program for support of progression (Progressing)       Start:  03/12/25    Expected End:  04/23/25               Lifting & carrying objects       LTG: Patient will lift/handle up to 50# to allow ability to return to work duties (Progressing)       Start:  03/12/25    Expected End:  04/23/25            STG: Patient will lift/handle up to 25# to improve functional ability to perform light work activities (Progressing)       Start:  03/12/25    Expected End:  04/09/25               Pain       LTG: Pt will report decreased pain to a 1-2/10 with ADL/IADL tasks.   (Progressing)       Start:  03/12/25    Expected End:  04/23/25            STG: Pt will report decreased pain to a 4/10 with ADL/IADL tasks.  (Progressing)       Start:   03/12/25    Expected End:  04/09/25               Range of Motion       LTG: Patient will increase left thumb PHIPPS by 50 degrees to improve ability to perform ADL/IADL  (Progressing)       Start:  03/12/25    Expected End:  04/23/25            STG: Patient will increase left thumb ROM by 15 degrees to improve ability to perform self-care and light prehension activities (Progressing)       Start:  03/12/25    Expected End:  04/09/25               Strength       STG: Pt will tolerate /pinch assessment and strengthening once allowed post-operatively.  (Progressing)       Start:  03/12/25    Expected End:  04/09/25            LTG: Pt will exhibit left  strength that is within 70% of right  strength to enable holding cooking utensils and pots/pans.  (Progressing)       Start:  03/12/25    Expected End:  04/23/25            LTG: Pt will exhibit 9-12# of left functional lateral pinch strength to enable use of utensils, opening containers, and turning keys.  (Progressing)       Start:  03/12/25    Expected End:  04/23/25                Plan     Continue occupational therapy services per Plan of Care set on evaluation.    Updates/Grading for next session: progress occupational therapy as tolerated    AGATA Nazario, JOSHT

## 2025-03-24 ENCOUNTER — CLINICAL SUPPORT (OUTPATIENT)
Dept: REHABILITATION | Facility: HOSPITAL | Age: 55
End: 2025-03-24
Payer: COMMERCIAL

## 2025-03-24 DIAGNOSIS — M25.649 THUMB JOINT STIFFNESS: Primary | ICD-10-CM

## 2025-03-24 DIAGNOSIS — R29.898 DECREASED GRIP STRENGTH OF LEFT HAND: ICD-10-CM

## 2025-03-24 DIAGNOSIS — M79.645 THUMB PAIN, LEFT: ICD-10-CM

## 2025-03-24 PROCEDURE — 97112 NEUROMUSCULAR REEDUCATION: CPT

## 2025-03-24 PROCEDURE — 97530 THERAPEUTIC ACTIVITIES: CPT

## 2025-03-24 PROCEDURE — 97110 THERAPEUTIC EXERCISES: CPT

## 2025-03-24 NOTE — PROGRESS NOTES
[facility]  Occupational Therapy Treatment Note     Today's Date: 3/24/2025  Name: Vinayak Turcios  Clinic Number: [id number]    Therapy Diagnosis:   Encounter Diagnoses   Name Primary?    Thumb joint stiffness Yes    Thumb pain, left     Decreased  strength of left hand      Physician: Reno Owens MD    Physician Orders: OT eval and tx  Medical Diagnosis: Open displaced fracture of distal phalanx of left thumb, initial encounter [S62.526Q]   MD Order Comments: Eval and treat     Evaluation Date: 3/12/2025  Insurance Authorization Period: 3/12/2025 to 12/31/2025  Plan of Care Certification Period: 3/12/2025 to 4/23/2025  Progress Note Due: 30 days (~4/9/2025)      Visit # / Visits authorized: 3/20  FOTO: 1/3  (Initial Score = 27% / Predicted Goal Score = 58%)     Surgical Procedure: Open treatment left thumb distal phalanx fracture, Left thumb nailbed repair, Debridement site of open fracture left thumb excisional including bone   Date of Surgery: 2/28/2025                             Date of Return to MD: 3/12/2025     Precautions:  Standard; pt completes 3 weeks post-op on 3/21/2025    Time In: 07:10  Time Out: 08:15  Total Time: 65 minutes  Total Billable Time: 65 minutes    Subjective     Pt reports: Feeling better with my thumb.  It feels worse in the morning when it is cool, it's a throbbing sensation.     he was compliant with home exercise program.  Response to previous treatment: less tenderness on the bottom of my thumb  Functional change: still trying to use my thumb, but use if for softer things    Pain: 1-2/10 (5/10 last visit, 8/10 on evaluation)  Location: distal thumb    Objective   Objective measures updated at initial evaluation unless specified.     3/24/2025: Thumb ROM: MP /47, IP /67 (initial visit /29, /16)  Treatment     Vinayak received the treatments listed below:      therapeutic exercises to develop strength, endurance, and ROM of left thumb/hand for 20  minutes including:  - reviewed HEP issued at evaluation  - , ROM,  measures w/ review of status and discussion of treatment plan   - thumb composite flexion, isolated IP flexion, palmar abduction, 3x10 ea; AAROM w/ thumb flexion stretches  - compression wrap w/ rubber glove covering for protection at work    therapeutic activities to improve functional performance of left thumb/hand for 15 minutes including:  - thumb pressing, blue foam (3x10), 2# pinch pin w/ lateral & 3 point pinch (3x10)  - gripping, isolated & composite, green Digi-flex, 4x10 ea      manual therapy techniques were applied to left thumb/hand for 0 minutes including:  - NT    neuromuscular re-education activities to improve Coordination and Proprioception of left thumb/hand for 30 minutes including:  - desensitization of left thumb w/ ROM control in Fluidotherapy, 100% air speed, 20 min  - Isospheres, CW & CCW, 5 min  - thumb w/ IP flexion control w/ in/out hand manipulation working sm obj/flat coins (50+)   self-care techniques to improve independence and safety with ADL/IADL tasks for 0 minutes including:  - NT    direct contact modalities after being cleared for contraindications for 0 minutes including:  - NT    supervised modalities after being cleared for contradictions for 0 minutes including:  -   Home Exercises and Education Provided     Education provided:   - reviewed HEP issued at evaluation  - progress toward goals    Education Recipient:  [x] Patient [] Other recipient present    Patient Learning Style:  [x] Demonstration [x] Listening [] Pictures/video [] Reading [] Tactile    Patient Response to Education:  [x] Demonstrates understanding [x] Verbalizes understanding    [] Requires assistance [] Requires continuing/additional education    Written Home Exercises Provided: Patient instructed to continue previously-issued HEP.  See EMR under Patient Instructions for exercises provided during therapy sessions.      Assessment     Vinayak  was seen for his 3rd tx session since initial evaluation on 3/12/2025.  Demonstrates increasing thumb IP flexion as compared to initial status and previous visit.  Progressed pinch activities against soft and lightly firm surface in addition to thumb manipulating of objects in/out hand requiring increased thumb IP flexion control.  Tolerated progression of exercises/activities without complaints.      Vinayak is progressing towards his goals and there are no updates to goals at this time. Pt prognosis is Excellent.     Vinayak will continue to benefit from skilled outpatient occupational therapy services to address the deficits listed in the problem list on initial evaluation, to provide pt/family education, and to maximize pt's level of independence in the home and community environment.     Pt's spiritual, cultural and educational needs considered and pt agreeable to plan of care and goals.    Anticipated barriers to occupational therapy: co-morbid conditions; consistent compliance to HEP and therapy attendance as recommended     Goals:   Active       Functional outcome       LTG: Pt will report an increase in FOTO intake score of > 70%, which would indicate an improvement in quality of life.  (Progressing)       Start:  03/12/25    Expected End:  04/23/25            STG: Pt will report an increase in FOTO intake score of > 50%, which would indicate an improvement in quality of life.  (Progressing)       Start:  03/12/25    Expected End:  04/09/25            LTG & STG: Patient will demonstrate independence in home program for support of progression (Progressing)       Start:  03/12/25    Expected End:  04/23/25               Lifting & carrying objects       LTG: Patient will lift/handle up to 50# to allow ability to return to work duties (Progressing)       Start:  03/12/25    Expected End:  04/23/25            STG: Patient will lift/handle up to 25# to improve functional ability to perform light work activities  (Progressing)       Start:  03/12/25    Expected End:  04/09/25               Pain       LTG: Pt will report decreased pain to a 1-2/10 with ADL/IADL tasks.   (Progressing)       Start:  03/12/25    Expected End:  04/23/25            STG: Pt will report decreased pain to a 4/10 with ADL/IADL tasks.  (Progressing)       Start:  03/12/25    Expected End:  04/09/25               Range of Motion       LTG: Patient will increase left thumb PHIPPS by 50 degrees to improve ability to perform ADL/IADL  (Progressing)       Start:  03/12/25    Expected End:  04/23/25            STG: Patient will increase left thumb ROM by 15 degrees to improve ability to perform self-care and light prehension activities (Progressing)       Start:  03/12/25    Expected End:  04/09/25               Strength       STG: Pt will tolerate /pinch assessment and strengthening once allowed post-operatively.  (Progressing)       Start:  03/12/25    Expected End:  04/09/25            LTG: Pt will exhibit left  strength that is within 70% of right  strength to enable holding cooking utensils and pots/pans.  (Progressing)       Start:  03/12/25    Expected End:  04/23/25            LTG: Pt will exhibit 9-12# of left functional lateral pinch strength to enable use of utensils, opening containers, and turning keys.  (Progressing)       Start:  03/12/25    Expected End:  04/23/25                Plan     Continue occupational therapy services per Plan of Care set on evaluation.    Updates/Grading for next session: progress occupational therapy as tolerated    AGATA Nazario, JOSHT

## 2025-03-26 ENCOUNTER — CLINICAL SUPPORT (OUTPATIENT)
Dept: REHABILITATION | Facility: HOSPITAL | Age: 55
End: 2025-03-26
Payer: COMMERCIAL

## 2025-03-26 DIAGNOSIS — M79.645 THUMB PAIN, LEFT: ICD-10-CM

## 2025-03-26 DIAGNOSIS — M25.649 THUMB JOINT STIFFNESS: Primary | ICD-10-CM

## 2025-03-26 DIAGNOSIS — R29.898 DECREASED GRIP STRENGTH OF LEFT HAND: ICD-10-CM

## 2025-03-26 PROCEDURE — 97110 THERAPEUTIC EXERCISES: CPT

## 2025-03-26 PROCEDURE — 97112 NEUROMUSCULAR REEDUCATION: CPT

## 2025-03-26 PROCEDURE — 97140 MANUAL THERAPY 1/> REGIONS: CPT

## 2025-03-26 NOTE — PROGRESS NOTES
[facility]  Occupational Therapy Treatment Note     Today's Date: 3/26/2025  Name: Vinayak Turcios  Clinic Number: [id number]    Therapy Diagnosis:   Encounter Diagnoses   Name Primary?    Thumb joint stiffness Yes    Thumb pain, left     Decreased  strength of left hand      Physician: Reno Owens MD    Physician Orders: OT eval and tx  Medical Diagnosis: Open displaced fracture of distal phalanx of left thumb, initial encounter [S62.528H]   MD Order Comments: Eval and treat     Evaluation Date: 3/12/2025  Insurance Authorization Period: 3/12/2025 to 12/31/2025  Plan of Care Certification Period: 3/12/2025 to 4/23/2025  Progress Note Due: 30 days (~4/9/2025)      Visit # / Visits authorized: 3/20  FOTO: 1/3  (Initial Score = 27% / Predicted Goal Score = 58%)     Surgical Procedure: Open treatment left thumb distal phalanx fracture, Left thumb nailbed repair, Debridement site of open fracture left thumb excisional including bone   Date of Surgery: 2/28/2025                             Date of Return to MD: 3/12/2025     Precautions:  Standard; pt completes 3 weeks post-op on 3/21/2025    Time In: 14:45  Time Out: 15:30  Total Time: 45 minutes  Total Billable Time: 45 minutes    Subjective     Pt reports: Feeling better and moving forward.  It has become less tender when put pressure on it.  Try not to  anything heavy.       he was compliant with home exercise program.  Response to previous treatment: less tenderness on the bottom of my thumb, moving better  Functional change: able to  light things, but can't grab anything quickly and put a lot of pressure on my thumb    Pain: 1-2/10 (1-2/10 last visit, 8/10 on evaluation)  Location: distal thumb    Objective   Objective measures updated at initial evaluation unless specified.     3/24/2025: Thumb ROM: MP /47, IP /67 (initial visit /29, /16)  Treatment     Vinayak received the treatments listed below:      therapeutic  exercises to develop strength, endurance, and ROM of left thumb/hand for 10 minutes including:  - reviewed HEP issued at evaluation  - , ROM,  measures w/ review of status and discussion of treatment plan   - thumb composite flexion, isolated IP flexion, palmar abduction, 3x10 ea; AAROM w/ thumb flexion stretches  - compression wrap post tx    therapeutic activities to improve functional performance of left thumb/hand for 15 minutes including:  - thumb pressing, blue foam (3x10), 2# pinch pin w/ lateral & 3 point pinch (3x10)  - gripping, isolated & composite, green Digi-flex, 4x10 ea      manual therapy techniques were applied to left thumb/hand for 0 minutes including:  - NT    neuromuscular re-education activities to improve Coordination and Proprioception of left thumb/hand for 20 minutes including:  - desensitization of left thumb w/ ROM control in Fluidotherapy, 100% air speed, 10 min  - desensitization w/ putty presses  - Isospheres, CW & CCW, 5 min  - thumb w/ IP flexion control w/ in/out hand manipulation working sm obj/flat coins (50+)   self-care techniques to improve independence and safety with ADL/IADL tasks for 0 minutes including:  - NT    direct contact modalities after being cleared for contraindications for 0 minutes including:  - NT    supervised modalities after being cleared for contradictions for 0 minutes including:  -   Home Exercises and Education Provided     Education provided:   - reviewed HEP issued at evaluation  - progress toward goals    Education Recipient:  [x] Patient [] Other recipient present    Patient Learning Style:  [x] Demonstration [x] Listening [] Pictures/video [] Reading [] Tactile    Patient Response to Education:  [x] Demonstrates understanding [x] Verbalizes understanding    [] Requires assistance [] Requires continuing/additional education    Written Home Exercises Provided: Patient instructed to continue previously-issued HEP.  See EMR under Patient Instructions  for exercises provided during therapy sessions.      Assessment     Vinayak was seen for his 4th tx session since initial evaluation on 3/12/2025.  Progressed light pinching exercise/activity to address desensitization of distal phalanx that is tolerated without complaints.  Thumb composite flexion continues to improve.  Discussed thumb taping for desensitization.      Vinayak is progressing towards his goals and there are no updates to goals at this time. Pt prognosis is Excellent.     Vinayak will continue to benefit from skilled outpatient occupational therapy services to address the deficits listed in the problem list on initial evaluation, to provide pt/family education, and to maximize pt's level of independence in the home and community environment.     Pt's spiritual, cultural and educational needs considered and pt agreeable to plan of care and goals.    Anticipated barriers to occupational therapy: co-morbid conditions; consistent compliance to HEP and therapy attendance as recommended     Goals:   Active       Functional outcome       LTG: Pt will report an increase in FOTO intake score of > 70%, which would indicate an improvement in quality of life.  (Progressing)       Start:  03/12/25    Expected End:  04/23/25            STG: Pt will report an increase in FOTO intake score of > 50%, which would indicate an improvement in quality of life.  (Progressing)       Start:  03/12/25    Expected End:  04/09/25            LTG & STG: Patient will demonstrate independence in home program for support of progression (Progressing)       Start:  03/12/25    Expected End:  04/23/25               Lifting & carrying objects       LTG: Patient will lift/handle up to 50# to allow ability to return to work duties (Progressing)       Start:  03/12/25    Expected End:  04/23/25            STG: Patient will lift/handle up to 25# to improve functional ability to perform light work activities (Progressing)       Start:  03/12/25     Expected End:  04/09/25               Pain       LTG: Pt will report decreased pain to a 1-2/10 with ADL/IADL tasks.   (Progressing)       Start:  03/12/25    Expected End:  04/23/25            STG: Pt will report decreased pain to a 4/10 with ADL/IADL tasks.  (Progressing)       Start:  03/12/25    Expected End:  04/09/25               Range of Motion       LTG: Patient will increase left thumb PHIPPS by 50 degrees to improve ability to perform ADL/IADL  (Progressing)       Start:  03/12/25    Expected End:  04/23/25            STG: Patient will increase left thumb ROM by 15 degrees to improve ability to perform self-care and light prehension activities (Progressing)       Start:  03/12/25    Expected End:  04/09/25               Strength       STG: Pt will tolerate /pinch assessment and strengthening once allowed post-operatively.  (Progressing)       Start:  03/12/25    Expected End:  04/09/25            LTG: Pt will exhibit left  strength that is within 70% of right  strength to enable holding cooking utensils and pots/pans.  (Progressing)       Start:  03/12/25    Expected End:  04/23/25            LTG: Pt will exhibit 9-12# of left functional lateral pinch strength to enable use of utensils, opening containers, and turning keys.  (Progressing)       Start:  03/12/25    Expected End:  04/23/25                Plan     Continue occupational therapy services per Plan of Care set on evaluation.    Updates/Grading for next session: progress occupational therapy as tolerated    AGATA Nazario, JOSHT

## 2025-04-01 ENCOUNTER — CLINICAL SUPPORT (OUTPATIENT)
Dept: REHABILITATION | Facility: HOSPITAL | Age: 55
End: 2025-04-01
Payer: COMMERCIAL

## 2025-04-01 DIAGNOSIS — M79.645 THUMB PAIN, LEFT: ICD-10-CM

## 2025-04-01 DIAGNOSIS — R29.898 DECREASED GRIP STRENGTH OF LEFT HAND: ICD-10-CM

## 2025-04-01 DIAGNOSIS — M25.649 THUMB JOINT STIFFNESS: Primary | ICD-10-CM

## 2025-04-01 PROCEDURE — 97110 THERAPEUTIC EXERCISES: CPT

## 2025-04-01 PROCEDURE — 97530 THERAPEUTIC ACTIVITIES: CPT

## 2025-04-01 PROCEDURE — 97112 NEUROMUSCULAR REEDUCATION: CPT

## 2025-04-01 NOTE — PROGRESS NOTES
[facility]  Occupational Therapy Treatment Note     Today's Date: 4/1/2025  Name: Vinayak Turcios  Clinic Number: [id number]    Therapy Diagnosis:   Encounter Diagnoses   Name Primary?    Thumb joint stiffness Yes    Thumb pain, left     Decreased  strength of left hand      Physician: Reno Owens MD    Physician Orders: OT eval and tx  Medical Diagnosis: Open displaced fracture of distal phalanx of left thumb, initial encounter [S62.529I]   MD Order Comments: Eval and treat     Evaluation Date: 3/12/2025  Insurance Authorization Period: 3/12/2025 to 12/31/2025  Plan of Care Certification Period: 3/12/2025 to 4/23/2025  Progress Note Due: 30 days (~4/9/2025)      Visit # / Visits authorized: 5/20  FOTO: 1/3  (Initial Score = 27% / Predicted Goal Score = 58%)     Surgical Procedure: Open treatment left thumb distal phalanx fracture, Left thumb nailbed repair, Debridement site of open fracture left thumb excisional including bone   Date of Surgery: 2/28/2025                             Date of Return to MD: 4/10/2025     Precautions:  Standard; pt completes 4 weeks post-op on 3/28/2025    Time In: 0700  Time Out: 0800  Total Time: 58 minutes  Total Billable Time: 58 minutes    Subjective     Pt reports: Feeling better with the tip not as sensitive and the movement doing better.  Using a sponge at home to do the tapping and do okay as long as as it isn't too hard.         he was compliant with home exercise program.  Response to previous treatment: less tenderness on the bottom of my thumb, moving better  Functional change: feel like I can grab things easier than I could last week    Pain: 1/10; if I bump it, it can get up to an 8/10(1-2/10 last visit, 8/10 on evaluation)  Location: distal thumb    Objective   Objective measures updated at initial evaluation unless specified.     4/1/2025: Thumb ROM: MP /47, IP /67 (initial visit /29, /16)  Treatment     Vinayak received the  treatments listed below:      therapeutic exercises to develop strength, endurance, and ROM of left thumb/hand for 15 minutes including:  - reviewed HEP issued at evaluation  - , ROM,  measures w/ review of status    - thumb composite flexion, isolated IP flexion, palmar abduction, 3x10 ea; AAROM w/ thumb flexion stretches  - compression wrap post tx    therapeutic activities to improve functional performance of left thumb/hand for 25 minutes including:  - thumb pressing, blue foam (x40); thumb plunger, thick blue band, 3x10;  4 & 6# pinch pin w/ 3 point pinch (x70 w/ sm flat objects); lateral pinching w/ 6#, 3x10  - gripping, isolated & composite, blue Digi-flex, 3x10 ea  - gripping w/ hand gripper, #4(69# resistance), 3x10      manual therapy techniques were applied to left thumb/hand for 0 minutes including:  - NT    neuromuscular re-education activities to improve Coordination and Proprioception of left thumb/hand for 18 minutes including:  - desensitization of left thumb w/ ROM control in Fluidotherapy, 100% air speed, 10 min  - desensitization w/ putty presses  - Isospheres, CW & CCW (w/ space), 5 min  - thumb w/ IP flexion control w/ in/out hand manipulation working sm obj/flat coins (50+)   supervised modalities after being cleared for contradictions for 0 minutes including:  - NT    Home Exercises and Education Provided     Education provided:   - reviewed HEP issued at evaluation  - progress toward goals    Education Recipient:  [x] Patient [] Other recipient present    Patient Learning Style:  [x] Demonstration [x] Listening [] Pictures/video [] Reading [] Tactile    Patient Response to Education:  [x] Demonstrates understanding [x] Verbalizes understanding    [] Requires assistance [] Requires continuing/additional education    Written Home Exercises Provided: Patient instructed to continue previously-issued HEP.  See EMR under Patient Instructions for exercises provided during therapy sessions.       Assessment     Vinayak was seen for his 5th tx session since initial evaluation on 3/12/2025.  Progressed resistance with thumb pinching activities and added resistive hand gripping that is tolerated without significant complaints.  Overall, patient is with improving tolerance to pressure on pad of distal thumb phalanx.        Vinayak is progressing towards his goals and there are no updates to goals at this time. Pt prognosis is Excellent.     Vinayak will continue to benefit from skilled outpatient occupational therapy services to address the deficits listed in the problem list on initial evaluation, to provide pt/family education, and to maximize pt's level of independence in the home and community environment.     Pt's spiritual, cultural and educational needs considered and pt agreeable to plan of care and goals.    Anticipated barriers to occupational therapy: co-morbid conditions; consistent compliance to HEP and therapy attendance as recommended     Goals:   Active       Functional outcome       LTG: Pt will report an increase in FOTO intake score of > 70%, which would indicate an improvement in quality of life.  (Progressing)       Start:  03/12/25    Expected End:  04/23/25            STG: Pt will report an increase in FOTO intake score of > 50%, which would indicate an improvement in quality of life.  (Progressing)       Start:  03/12/25    Expected End:  04/09/25            LTG & STG: Patient will demonstrate independence in home program for support of progression (Progressing)       Start:  03/12/25    Expected End:  04/23/25               Lifting & carrying objects       LTG: Patient will lift/handle up to 50# to allow ability to return to work duties (Progressing)       Start:  03/12/25    Expected End:  04/23/25            STG: Patient will lift/handle up to 25# to improve functional ability to perform light work activities (Progressing)       Start:  03/12/25    Expected End:  04/09/25                Pain       LTG: Pt will report decreased pain to a 1-2/10 with ADL/IADL tasks.   (Progressing)       Start:  03/12/25    Expected End:  04/23/25            STG: Pt will report decreased pain to a 4/10 with ADL/IADL tasks.  (Progressing)       Start:  03/12/25    Expected End:  04/09/25               Range of Motion       LTG: Patient will increase left thumb PHIPPS by 50 degrees to improve ability to perform ADL/IADL  (Progressing)       Start:  03/12/25    Expected End:  04/23/25            STG: Patient will increase left thumb ROM by 15 degrees to improve ability to perform self-care and light prehension activities (Progressing)       Start:  03/12/25    Expected End:  04/09/25               Strength       STG: Pt will tolerate /pinch assessment and strengthening once allowed post-operatively.  (Progressing)       Start:  03/12/25    Expected End:  04/09/25            LTG: Pt will exhibit left  strength that is within 70% of right  strength to enable holding cooking utensils and pots/pans.  (Progressing)       Start:  03/12/25    Expected End:  04/23/25            LTG: Pt will exhibit 9-12# of left functional lateral pinch strength to enable use of utensils, opening containers, and turning keys.  (Progressing)       Start:  03/12/25    Expected End:  04/23/25                Plan     Continue occupational therapy services per Plan of Care set on evaluation.    Updates/Grading for next session: progress occupational therapy as tolerated    AGATA Nazario, JOSHT

## 2025-04-03 ENCOUNTER — CLINICAL SUPPORT (OUTPATIENT)
Dept: REHABILITATION | Facility: HOSPITAL | Age: 55
End: 2025-04-03
Payer: COMMERCIAL

## 2025-04-03 DIAGNOSIS — M25.649 THUMB JOINT STIFFNESS: Primary | ICD-10-CM

## 2025-04-03 DIAGNOSIS — M79.645 THUMB PAIN, LEFT: ICD-10-CM

## 2025-04-03 DIAGNOSIS — R29.898 DECREASED GRIP STRENGTH OF LEFT HAND: ICD-10-CM

## 2025-04-03 PROCEDURE — 97110 THERAPEUTIC EXERCISES: CPT

## 2025-04-03 PROCEDURE — 97530 THERAPEUTIC ACTIVITIES: CPT

## 2025-04-03 NOTE — PROGRESS NOTES
[facility]  Occupational Therapy Treatment Note     Today's Date: 4/3/2025  Name: Vinayak Turcios  Clinic Number: [id number]    Therapy Diagnosis:   Encounter Diagnoses   Name Primary?    Thumb joint stiffness Yes    Thumb pain, left     Decreased  strength of left hand      Physician: Reno Owens MD    Physician Orders: OT eval and tx  Medical Diagnosis: Open displaced fracture of distal phalanx of left thumb, initial encounter [S62.522B]   MD Order Comments: Eval and treat     Evaluation Date: 3/12/2025  Insurance Authorization Period: 3/12/2025 to 12/31/2025  Plan of Care Certification Period: 3/12/2025 to 4/23/2025  Progress Note Due: 30 days (~4/9/2025)      Visit # / Visits authorized: 6/20  FOTO: 1/3  (Initial Score = 27% / Predicted Goal Score = 58%)     Surgical Procedure: Open treatment left thumb distal phalanx fracture, Left thumb nailbed repair, Debridement site of open fracture left thumb excisional including bone   Date of Surgery: 2/28/2025                             Date of Return to MD: 4/10/2025     Precautions:  Standard; pt completes 4 weeks post-op on 3/28/2025    Time In: 0700  Time Out: 0800  Total Time: 60 minutes  Total Billable Time: 60 minutes    Subjective     Pt reports: not feeling that popping in my thumb like I use to.  Able to tolerate more pressure on the bottom side of my thumb without it feeling as tender as it was.  I use my thumb more around home, but not really using it around work.          he was compliant with home exercise program.  Response to previous treatment: still with less tenderness on the bottom of my thumb, moving better  Functional change: able to  on more things, but light things; don't  on anything to heavy    Pain: less than a 1/10; if I bump it, it can get up to an 8/10(1-2/10 last visit, 8/10 on evaluation)  Location: distal thumb    Objective   Objective measures updated at initial evaluation unless  specified.     and Pinch Strength (in pounds, psi's):   Right Left    4/3/2025 4/3/2025     121   Lateral 23 17   3pt / Tripod 26 24   Initial measurements recorded 4/3/2025      4/1/2025: Thumb ROM: MP /47, IP /67 (initial visit /29, /16)  Treatment     Vinayak received the treatments listed below:      therapeutic exercises to develop strength, endurance, and ROM of left thumb/hand for 30 minutes including:  - reviewed HEP issued at evaluation  -  /pinch strength measures w/ review of status    - left thumb w/ ROM control in Fluidotherapy, 100% air speed, 18 min  - thumb composite flexion, isolated IP flexion, palmar abduction, 3x10 ea; AAROM w/ thumb flexion stretches  - compression wrap post tx    therapeutic activities to improve functional performance of left thumb/hand for 30 minutes including:  - thumb pressing, green foam (x50); thumb plunger, thick blue band, 3x10;  6# pinch pin w/ 3 point pinch (x70 w/ sm flat objects); lateral pinching w/ 8#, 3x10  - gripping, isolated & composite, blue Digi-flex, 4x10 ea  - gripping w/ hand gripper w/ black spring, #4(69# resistance), 3x10      manual therapy techniques were applied to left thumb/hand for 0 minutes including:  - NT    neuromuscular re-education activities to improve Coordination and Proprioception of left thumb/hand for 0 minutes including:  -   Home Exercises and Education Provided     Education provided:   - reviewed HEP issued at evaluation  - progress toward goals    Education Recipient:  [x] Patient [] Other recipient present    Patient Learning Style:  [x] Demonstration [x] Listening [] Pictures/video [] Reading [] Tactile    Patient Response to Education:  [x] Demonstrates understanding [x] Verbalizes understanding    [] Requires assistance [] Requires continuing/additional education    Written Home Exercises Provided: Patient instructed to continue previously-issued HEP.  See EMR under Patient Instructions for exercises provided  during therapy sessions.      Assessment     Vinayak was seen for his 6th tx session since initial evaluation on 3/12/2025.  Baseline  and pinch strength measurements recorded with today's visit demonstrating fair effort as compared to right hand.  Vinayak is able to tolerate increasing pressure applied to volar thumb distal phalanx with pinching and pressing activities.           Vinayak is progressing towards his goals and there are no updates to goals at this time. Pt prognosis is Excellent.     Vinayak will continue to benefit from skilled outpatient occupational therapy services to address the deficits listed in the problem list on initial evaluation, to provide pt/family education, and to maximize pt's level of independence in the home and community environment.     Pt's spiritual, cultural and educational needs considered and pt agreeable to plan of care and goals.    Anticipated barriers to occupational therapy: co-morbid conditions; consistent compliance to HEP and therapy attendance as recommended     Goals:   Active       Functional outcome       LTG: Pt will report an increase in FOTO intake score of > 70%, which would indicate an improvement in quality of life.  (Progressing)       Start:  03/12/25    Expected End:  04/23/25            STG: Pt will report an increase in FOTO intake score of > 50%, which would indicate an improvement in quality of life.  (Progressing)       Start:  03/12/25    Expected End:  04/09/25            LTG & STG: Patient will demonstrate independence in home program for support of progression (Progressing)       Start:  03/12/25    Expected End:  04/23/25               Lifting & carrying objects       LTG: Patient will lift/handle up to 50# to allow ability to return to work duties (Progressing)       Start:  03/12/25    Expected End:  04/23/25            STG: Patient will lift/handle up to 25# to improve functional ability to perform light work activities (Progressing)       Start:   03/12/25    Expected End:  04/09/25               Pain       LTG: Pt will report decreased pain to a 1-2/10 with ADL/IADL tasks.   (Progressing)       Start:  03/12/25    Expected End:  04/23/25            STG: Pt will report decreased pain to a 4/10 with ADL/IADL tasks.  (Progressing)       Start:  03/12/25    Expected End:  04/09/25               Range of Motion       LTG: Patient will increase left thumb PHIPPS by 50 degrees to improve ability to perform ADL/IADL  (Progressing)       Start:  03/12/25    Expected End:  04/23/25            STG: Patient will increase left thumb ROM by 15 degrees to improve ability to perform self-care and light prehension activities (Progressing)       Start:  03/12/25    Expected End:  04/09/25               Strength       STG: Pt will tolerate /pinch assessment and strengthening once allowed post-operatively.  (Progressing)       Start:  03/12/25    Expected End:  04/09/25            LTG: Pt will exhibit left  strength that is within 70% of right  strength to enable holding cooking utensils and pots/pans.  (Progressing)       Start:  03/12/25    Expected End:  04/23/25            LTG: Pt will exhibit 9-12# of left functional lateral pinch strength to enable use of utensils, opening containers, and turning keys.  (Progressing)       Start:  03/12/25    Expected End:  04/23/25                Plan     Continue occupational therapy services per Plan of Care set on evaluation.    Updates/Grading for next session: progress occupational therapy as tolerated; re-assess next week prior to patient's return to [facility], CHT

## 2025-04-08 ENCOUNTER — CLINICAL SUPPORT (OUTPATIENT)
Dept: REHABILITATION | Facility: HOSPITAL | Age: 55
End: 2025-04-08
Payer: COMMERCIAL

## 2025-04-08 DIAGNOSIS — R29.898 DECREASED GRIP STRENGTH OF LEFT HAND: ICD-10-CM

## 2025-04-08 DIAGNOSIS — M25.649 THUMB JOINT STIFFNESS: Primary | ICD-10-CM

## 2025-04-08 DIAGNOSIS — M79.645 THUMB PAIN, LEFT: ICD-10-CM

## 2025-04-08 PROCEDURE — 97530 THERAPEUTIC ACTIVITIES: CPT

## 2025-04-08 PROCEDURE — 97110 THERAPEUTIC EXERCISES: CPT

## 2025-04-08 NOTE — PROGRESS NOTES
[facility]  Occupational Therapy Treatment Note & Progress Report     Today's Date: 4/8/2025  Name: Vinayak Turcios  Clinic Number: [id number]    Therapy Diagnosis:   Encounter Diagnoses   Name Primary?    Thumb joint stiffness Yes    Thumb pain, left     Decreased  strength of left hand      Physician: Reno Owens MD    Physician Orders: OT eval and tx  Medical Diagnosis: Open displaced fracture of distal phalanx of left thumb, initial encounter [S62.522B]   MD Order Comments: Eval and treat     Evaluation Date: 3/12/2025  Insurance Authorization Period: 3/12/2025 to 12/31/2025  Plan of Care Certification Period: 3/12/2025 to 4/23/2025  Progress Note Due: 30 days (~5/6/2025)      Visit # / Visits authorized: 7/20  FOTO: 2/3     (Predicted Goal Score = 58%)  Initial Score = 27% / Updated status (4/8/2025) = 79%     Surgical Procedure: Open treatment left thumb distal phalanx fracture, Left thumb nailbed repair, Debridement site of open fracture left thumb excisional including bone   Date of Surgery: 2/28/2025                             Date of Return to MD: 4/10/2025     Precautions:  Standard; pt completes 5 weeks post-op on 4/4/2025    Time In: 0700  Time Out: 0758  Total Time: 58 minutes  Total Billable Time: 58 minutes    Subjective     Pt reports: thumb is feeling better and not having the tightness/pain under the bottom of my thumb.  Able to tolerate more pressure on the bottom side of my thumb without it feeling tender like it was.  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         he was compliant with home exercise program.  Response to previous treatment: less tenderness on the bottom of my thumb, moving better  Functional change: able to use my left hand and thumb to  handle and pull open microwave door    Pain: less than a 1/10; if I bump it, it can get up to an 8/10(1-2/10 last visit, 8/10 on  evaluation)  Location: distal thumb    Objective   Objective measures updated at progress report 4/8/2025 unless specified.    Edema: Circumferential measurements (in centimeters)    Right Left Left     3/12/2025 3/12/2025 4/8/2025   Thumb Proximal Phalanx  8.2 8.5 8.1   Thumb IP Joint 8.6 8.8 8.3         left Hand Active Range of Motion: WNL throughout     left Thumb Active Range of Motion:    Right Left Left   (Ext/Flex) 3/12/2025 3/12/2025 4/8/2025   MCP Jt 0/40 0/29° 0/50   IP Jt +20/62 +10/16° +10/63   PHIPPS 122 55 123   Kapandji Score 10/10 7/10 10/10   Palmar Abd 65 65° 70   Radial Abd 60 55° 66        and Pinch Strength (in pounds, psi's):   Right Left Left    4/3/2025 4/3/2025 4/8/2025     121 120   Lateral 23 17 19   3pt / Tripod 26 24 22,25   Initial measurements recorded 4/3/2025     Treatment     Vinayak received the treatments listed below:      therapeutic exercises to develop strength, endurance, and ROM of left thumb/hand for 22 minutes including:  - reviewed HEP issued at evaluation  - girth, ROM, FOTO /pinch strength measures w/ review of status    - left thumb w/ ROM control in Fluidotherapy, 100% air speed, 11 min  - compression wrap post tx    therapeutic activities to improve functional performance of left thumb/hand for 36 minutes including:  - thumb pressing/pinching w/ Velcro board; thumb plunger, thick blue band, 3x20;  10# pinch pin w/ 3 point pinch (x70 w/ sm flat objects); lateral pinching w/ 10#, 3x10  - gripping, isolated & composite, blue Digi-flex, 4x10 ea  - gripping w/ hand gripper w/ silver spring, #5(100# resistance), (3x10) x2      manual therapy techniques were applied to left thumb/hand for 0 minutes including:  - NT    Home Exercises and Education Provided     Education provided:   - reviewed HEP issued at evaluation  - progress toward goals    Education Recipient:  [x] Patient [] Other recipient present    Patient Learning Style:  [x] Demonstration [x]  Listening [] Pictures/video [] Reading [] Tactile    Patient Response to Education:  [x] Demonstrates understanding [x] Verbalizes understanding    [] Requires assistance [] Requires continuing/additional education    Written Home Exercises Provided: Patient instructed to continue previously-issued HEP.  See EMR under Patient Instructions for exercises provided during therapy sessions.      Assessment     Vinayak was seen for his 7th tx session since initial evaluation on 3/12/2025.  Demonstrates decreasing pain/hypersensitivity and edema as compared to initial status.  Left thumb active range of motion is WNL and comparable to right non-involved side.  Demonstrates good effort with left  and pinch strength with comparable status to right.  Progressed clinic activities to engage in hand tool/parts task allowing firm pressure on left thumb distal phalanx without complaints.  Progressed resistance level with functional pinching and gripping activities that patient tolerates without complaints of distal phalanx discomfort.     Vinayak is progressing towards his goals and there are no updates to goals at this time. Pt prognosis is Excellent.     Vinayak will continue to benefit from skilled outpatient occupational therapy services to address the deficits listed in the problem list on initial evaluation, to provide pt/family education, and to maximize pt's level of independence in the home and community environment.     Pt's spiritual, cultural and educational needs considered and pt agreeable to plan of care and goals.    Anticipated barriers to occupational therapy: co-morbid conditions; consistent compliance to HEP and therapy attendance as recommended     Goals:   Active       Functional outcome       LTG: Pt will report an increase in FOTO intake score of > 70%, which would indicate an improvement in quality of life.  (Met)       Start:  03/12/25    Expected End:  04/23/25    Resolved:  04/08/25         STG: Pt will  report an increase in FOTO intake score of > 50%, which would indicate an improvement in quality of life.  (Met)       Start:  03/12/25    Expected End:  04/09/25    Resolved:  04/08/25         LTG & STG: Patient will demonstrate independence in home program for support of progression (Progressing)       Start:  03/12/25    Expected End:  04/23/25               Lifting & carrying objects       LTG: Patient will lift/handle up to 50# to allow ability to return to work duties (Progressing)       Start:  03/12/25    Expected End:  04/23/25            STG: Patient will lift/handle up to 25# to improve functional ability to perform light work activities (Progressing)       Start:  03/12/25    Expected End:  04/09/25               Pain       LTG: Pt will report decreased pain to a 1-2/10 with ADL/IADL tasks.   (Progressing)       Start:  03/12/25    Expected End:  04/23/25            STG: Pt will report decreased pain to a 4/10 with ADL/IADL tasks.  (Met)       Start:  03/12/25    Expected End:  04/09/25    Resolved:  04/08/25           Resolved       Range of Motion       LTG: Patient will increase left thumb PHIPPS by 50 degrees to improve ability to perform ADL/IADL  (Met)       Start:  03/12/25    Expected End:  04/23/25    Resolved:  04/08/25         STG: Patient will increase left thumb ROM by 15 degrees to improve ability to perform self-care and light prehension activities (Met)       Start:  03/12/25    Expected End:  04/09/25    Resolved:  04/08/25            Strength       STG: Pt will tolerate /pinch assessment and strengthening once allowed post-operatively.  (Met)       Start:  03/12/25    Expected End:  04/09/25    Resolved:  04/08/25         LTG: Pt will exhibit left  strength that is within 70% of right  strength to enable holding cooking utensils and pots/pans.  (Met)       Start:  03/12/25    Expected End:  04/23/25    Resolved:  04/08/25         LTG: Pt will exhibit 9-12# of left functional  lateral pinch strength to enable use of utensils, opening containers, and turning keys.  (Met)       Start:  03/12/25    Expected End:  04/23/25    Resolved:  04/08/25             Plan     Continue occupational therapy services per Plan of Care set on evaluation.    Updates/Grading for next session: progress occupational therapy as tolerated; add functional lifting activities addressing work related duties over remaining treatment visits; plan to progress to discharge with end of POC pending MD recommendation    AGATA Nazario, JOSHT

## 2025-04-10 ENCOUNTER — OFFICE VISIT (OUTPATIENT)
Dept: ORTHOPEDICS | Facility: CLINIC | Age: 55
End: 2025-04-10
Payer: COMMERCIAL

## 2025-04-10 ENCOUNTER — CLINICAL SUPPORT (OUTPATIENT)
Dept: REHABILITATION | Facility: HOSPITAL | Age: 55
End: 2025-04-10
Payer: COMMERCIAL

## 2025-04-10 DIAGNOSIS — M79.645 THUMB PAIN, LEFT: ICD-10-CM

## 2025-04-10 DIAGNOSIS — M25.649 THUMB JOINT STIFFNESS: Primary | ICD-10-CM

## 2025-04-10 DIAGNOSIS — S62.522D OPEN DISPLACED FRACTURE OF DISTAL PHALANX OF LEFT THUMB WITH ROUTINE HEALING, SUBSEQUENT ENCOUNTER: Primary | ICD-10-CM

## 2025-04-10 DIAGNOSIS — R29.898 DECREASED GRIP STRENGTH OF LEFT HAND: ICD-10-CM

## 2025-04-10 PROCEDURE — 99999 PR PBB SHADOW E&M-EST. PATIENT-LVL II: CPT | Mod: PBBFAC,,, | Performed by: ORTHOPAEDIC SURGERY

## 2025-04-10 PROCEDURE — 97110 THERAPEUTIC EXERCISES: CPT

## 2025-04-10 PROCEDURE — 97140 MANUAL THERAPY 1/> REGIONS: CPT

## 2025-04-10 PROCEDURE — 97530 THERAPEUTIC ACTIVITIES: CPT

## 2025-04-10 NOTE — PROGRESS NOTES
TAWANNA Owens M.D.  Orthopaedic Hand and Wrist Surgery  [address]    Patient ID: Vinayak Turcios  YOB: 1970  MRN: [id number]    Date of Surgery:  2025    Procedure Performed:   Debridement, Open Fracture, Hand, Phalanges - Left, Repair, Nail Bed - Left, and Open Reduction, Fracture, Phalanx, Finger - Left    History of Present Illness: Vinayak Turcios is a 54 y.o. male doing well postop his nail is starting to grow he has minimal pain feels like he is ready to go back to heavy duty    Patient was queried and this is the extent of the patients current complaints today.    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 Open displaced fracture of distal phalanx of left thumb with routine healing, subsequent encounter  Assessment & Plan:  He will return to clinic in 6 weeks  May return to full duty on Monday  He will complete his therapy  I will plan on seeing him back in 6 weeks at which point he will likely be MMI  Patient understands the risk of nonunion which has a high likelihood of being asymptomatic.             I discussed worrisome and red flag signs and symptoms with the patient. The patient expressed understanding and agreed to alert me immediately or to go to the emergency room if they experience any of these.   Treatment plan was developed with input from the patient/family, and they expressed understanding and agreement with the plan. All questions were answered today.    There are no Patient Instructions on file for this visit.    TAWANNA Owens M.D.  [facility]  Orthopedic Hand and Wrist Surgeon    [facility]  Dr. Clark Owens   [employer] Review   [employer]   [employer]     Disclaimer: This note was prepared using a voice recognition system and is likely to have sound alike errors within the text.

## 2025-04-10 NOTE — ASSESSMENT & PLAN NOTE
He will return to clinic in 6 weeks  May return to full duty on Monday  He will complete his therapy  I will plan on seeing him back in 6 weeks at which point he will likely be MMI  Patient understands the risk of nonunion which has a high likelihood of being asymptomatic.

## 2025-04-10 NOTE — PROGRESS NOTES
[facility]  Occupational Therapy Treatment Note      Today's Date: 4/10/2025  Name: Vinayak Turcios  Clinic Number: [id number]    Therapy Diagnosis:   Encounter Diagnoses   Name Primary?    Thumb joint stiffness Yes    Thumb pain, left     Decreased  strength of left hand      Physician: Reno Owens MD    Physician Orders: OT eval and tx  Medical Diagnosis: Open displaced fracture of distal phalanx of left thumb, initial encounter [S62.522B]   MD Order Comments: Eval and treat     Evaluation Date: 3/12/2025  Insurance Authorization Period: 3/12/2025 to 12/31/2025  Plan of Care Certification Period: 3/12/2025 to 4/23/2025  Progress Note Due: 30 days (~5/6/2025)      Visit # / Visits authorized: 8/20  FOTO: 2/3     (Predicted Goal Score = 58%)  Initial Score = 27% / Updated status (4/8/2025) = 79%     Surgical Procedure: Open treatment left thumb distal phalanx fracture, Left thumb nailbed repair, Debridement site of open fracture left thumb excisional including bone   Date of Surgery: 2/28/2025                             Date of Return to MD: 4/10/2025     Precautions:  Standard; pt completes 6 weeks post-op on 4/11/2025    Time In: 1258  Time Out: 1350  Total Time: 57 minutes  Total Billable Time: 57 minutes    Subjective     Pt reports: thumb still feeling better.  Might feel a little tight in the morning, but then loosens up.          he was compliant with home exercise program.  Response to previous treatment: less tenderness on the bottom of my thumb, moving better  Functional change: able to use my left hand and thumb to  handle and pull open microwave door    Pain: less than a 1/10; if I bump it, it can get up to an 8/10(1-2/10 last visit, 8/10 on evaluation)  Location: distal thumb    Objective   Objective measures updated at progress report 4/8/2025 unless specified.    Treatment     Vinayak received the treatments listed below:      therapeutic exercises to develop  strength, endurance, and ROM of left thumb/hand for 13 minutes including:  - reviewed HEP issued at evaluation  - girth, ROM, FOTO /pinch strength measures w/ review of status    - left thumb w/ ROM control in Fluidotherapy, 100% air speed, 10 min  - compression wrap post tx    therapeutic activities to improve functional performance of left thumb/hand for 36 minutes including:  - thumb pressing/pinching w/ Velcro board; thumb plunger, thick blue band, 3x20;  10# pinch pin w/ 3 point pinch, 4x10; lateral pinching w/ 10#, 3x10  - gripping, isolated & composite & thumb flexion presses, blue Digi-flex, 4x10 ea  - gripping w/ hand gripper w/ silver spring, #5(100# resistance), 4 shoulder positions, (x10 ea) x  - lifting/handling/carrying - 20, 30,40# kettle bell carry; 25, 50# weight lifting/handling      manual therapy techniques were applied to left thumb/hand for 8 minutes including:  - thumb IP flexion, composite flexion passive mobilization     Home Exercises and Education Provided     Education provided:   - reviewed HEP issued at evaluation  - progress toward goals    Education Recipient:  [x] Patient [] Other recipient present    Patient Learning Style:  [x] Demonstration [x] Listening [] Pictures/video [] Reading [] Tactile    Patient Response to Education:  [x] Demonstrates understanding [x] Verbalizes understanding    [] Requires assistance [] Requires continuing/additional education    Written Home Exercises Provided: Patient instructed to continue previously-issued HEP.  See EMR under Patient Instructions for exercises provided during therapy sessions.      Assessment     Vinayak was seen for his 8th tx session since initial evaluation on 3/12/2025.  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  Patient to see MD following today's therapy appointment.    Vinayak is  progressing towards his goals and there are no updates to goals at this time. Pt prognosis is Excellent.     Vinayak will continue to benefit from skilled outpatient occupational therapy services to address the deficits listed in the problem list on initial evaluation, to provide pt/family education, and to maximize pt's level of independence in the home and community environment.     Pt's spiritual, cultural and educational needs considered and pt agreeable to plan of care and goals.    Anticipated barriers to occupational therapy: co-morbid conditions; consistent compliance to HEP and therapy attendance as recommended     Goals:   Active       Functional outcome       LTG: Pt will report an increase in FOTO intake score of > 70%, which would indicate an improvement in quality of life.  (Met)       Start:  03/12/25    Expected End:  04/23/25    Resolved:  04/08/25         STG: Pt will report an increase in FOTO intake score of > 50%, which would indicate an improvement in quality of life.  (Met)       Start:  03/12/25    Expected End:  04/09/25    Resolved:  04/08/25         LTG & STG: Patient will demonstrate independence in home program for support of progression (Progressing)       Start:  03/12/25    Expected End:  04/23/25               Lifting & carrying objects       LTG: Patient will lift/handle up to 50# to allow ability to return to work duties (Progressing)       Start:  03/12/25    Expected End:  04/23/25            STG: Patient will lift/handle up to 25# to improve functional ability to perform light work activities (Progressing)       Start:  03/12/25    Expected End:  04/09/25               Pain       LTG: Pt will report decreased pain to a 1-2/10 with ADL/IADL tasks.   (Progressing)       Start:  03/12/25    Expected End:  04/23/25            STG: Pt will report decreased pain to a 4/10 with ADL/IADL tasks.  (Met)       Start:  03/12/25    Expected End:  04/09/25    Resolved:  04/08/25            Resolved       Range of Motion       LTG: Patient will increase left thumb PHIPPS by 50 degrees to improve ability to perform ADL/IADL  (Met)       Start:  03/12/25    Expected End:  04/23/25    Resolved:  04/08/25         STG: Patient will increase left thumb ROM by 15 degrees to improve ability to perform self-care and light prehension activities (Met)       Start:  03/12/25    Expected End:  04/09/25    Resolved:  04/08/25            Strength       STG: Pt will tolerate /pinch assessment and strengthening once allowed post-operatively.  (Met)       Start:  03/12/25    Expected End:  04/09/25    Resolved:  04/08/25         LTG: Pt will exhibit left  strength that is within 70% of right  strength to enable holding cooking utensils and pots/pans.  (Met)       Start:  03/12/25    Expected End:  04/23/25    Resolved:  04/08/25         LTG: Pt will exhibit 9-12# of left functional lateral pinch strength to enable use of utensils, opening containers, and turning keys.  (Met)       Start:  03/12/25    Expected End:  04/23/25    Resolved:  04/08/25             Plan     Continue occupational therapy services per Plan of Care set on evaluation.    Updates/Grading for next session: progress occupational therapy as tolerated; add functional lifting activities addressing work related duties over remaining treatment visits; plan to progress to discharge with end of POC pending MD recommendation    AGATA Nazario, CHT

## 2025-04-11 ENCOUNTER — TELEPHONE (OUTPATIENT)
Dept: URGENT CARE | Facility: CLINIC | Age: 55
End: 2025-04-11
Payer: COMMERCIAL

## 2025-04-11 NOTE — TELEPHONE ENCOUNTER
Left thumb/hand.        Requesting medical records and bills for the following:     NAME: Vinayak Turcios  : 1970  DOA: 25  #: [id number]        [facility]  Attn: Medical Bill Review  [address]  [place] [address]      [facility]  [address] [address]  [place] [address]  [place] Fax: [phone]  [place] Email: [email or web address]           Niharika Caban [facility]  [facility]        [O] [phone] [address] [place] [address]         [email or web address]

## 2025-04-15 ENCOUNTER — DOCUMENTATION ONLY (OUTPATIENT)
Dept: REHABILITATION | Facility: HOSPITAL | Age: 55
End: 2025-04-15
Payer: COMMERCIAL

## 2025-04-15 PROBLEM — R29.898 DECREASED GRIP STRENGTH OF LEFT HAND: Status: RESOLVED | Noted: 2025-03-17 | Resolved: 2025-04-10

## 2025-04-15 PROBLEM — M79.645 THUMB PAIN, LEFT: Status: RESOLVED | Noted: 2025-03-17 | Resolved: 2025-04-10

## 2025-04-15 PROBLEM — M25.649 THUMB JOINT STIFFNESS: Status: RESOLVED | Noted: 2025-03-17 | Resolved: 2025-04-10

## 2025-04-15 NOTE — PROGRESS NOTES
[facility]   Occupational Therapy Discharge Note    Name: Vinayak Turcios  Clinic Number: [id number]    Physician Orders: OT eval and tx  Medical Diagnosis: Open displaced fracture of distal phalanx of left thumb, initial encounter [S62.522B]      Evaluation Date: 3/12/2025     Date of Last visit:  4/10/2025  Total Visits Received: 8    ASSESSMENT      See daily note    Discharge reason: Patient has reached the maximum rehab potential for the present time    Discharge FOTO Score: See daily note    Goals: See daily note    PLAN   This patient is discharged from occupational therapy.      AGATA Nazario, JOSHT

## 2025-04-22 NOTE — TELEPHONE ENCOUNTER
Spoke with [place] whom was trying to get both appointments on the same day to avoid coming to [facility] two days in a row, successfully got both appointments on the same day to accommodate patient.     ----- Message from Db sent at 3/11/2025  8:07 AM CDT -----  Type:  Needs Medical AdviceWho Called: JOSE STEINER [[phone]]Symptoms (please be specific):  How long has patient had these symptoms:  Pharmacy name and phone #:  Would the patient rather a call back or a response via [facility]? Best Call Back Number: [phone] Additional Information: Ginger[hne called in regards to Patient will like to reschedule appt for Tuesday 3-12 if possible   No

## (undated) DEVICE — GLOVE BIOGEL 7.5

## (undated) DEVICE — SPONGE GAUZE 4X4 12 PLY STRL

## (undated) DEVICE — UNDERGLOVE BIOGEL PI SZ 6.5 LF

## (undated) DEVICE — PACK BASIC SETUP SC BR

## (undated) DEVICE — COVER CAMERA OPERATING ROOM

## (undated) DEVICE — COVER LIGHT HANDLE 80/CA

## (undated) DEVICE — DRAPE STERI-DRAPE 1000 17X11IN

## (undated) DEVICE — BANDAGE ESMARK ELASTIC ST 4X9

## (undated) DEVICE — DRAPE U SPLIT SHEET 54X76IN

## (undated) DEVICE — KIT COLLECTION E SWAB REGULAR

## (undated) DEVICE — KIT TURNOVER

## (undated) DEVICE — BANDAGE MATRIX HK LOOP 3IN 5YD

## (undated) DEVICE — POSITIONER HEAD DONUT 9IN FOAM

## (undated) DEVICE — GOWN FAB REINF SET-IN SLV LG

## (undated) DEVICE — GAUZE CNFRM STRL 2INX4.1YD

## (undated) DEVICE — SYR 10CC LUER LOCK

## (undated) DEVICE — Device

## (undated) DEVICE — DRAPE HAND STERILE

## (undated) DEVICE — ALCOHOL 70% ANTISEPTIC ISO 4OZ

## (undated) DEVICE — APPLICATOR CHLORAPREP ORN 26ML

## (undated) DEVICE — SUPPORT ULNA NERVE PROTECTOR

## (undated) DEVICE — SPONGE COTTON TRAY 4X4IN

## (undated) DEVICE — GAUZE WOVEN STRL 12-PLY 4X4IN

## (undated) DEVICE — SCRUB HIBICLENS 4% CHG 4OZ

## (undated) DEVICE — SOL NACL 0.9% IV INJ 1000ML

## (undated) DEVICE — ALCOHOL 70% ISOP RUBBING 4OZ

## (undated) DEVICE — TOWEL OR DISP STRL BLUE 4/PK

## (undated) DEVICE — DRESSING XEROFORM NONADH 1X8IN

## (undated) DEVICE — CORD CAUTERY BIPOLAR STERILE

## (undated) DEVICE — PAD CAST SPECIALIST STRL 3